# Patient Record
Sex: FEMALE | Race: WHITE | Employment: UNEMPLOYED | URBAN - METROPOLITAN AREA
[De-identification: names, ages, dates, MRNs, and addresses within clinical notes are randomized per-mention and may not be internally consistent; named-entity substitution may affect disease eponyms.]

---

## 2017-09-25 ENCOUNTER — HOSPITAL ENCOUNTER (INPATIENT)
Age: 77
LOS: 7 days | Discharge: HOME HEALTH | DRG: 813 | End: 2017-10-02
Attending: EMERGENCY MEDICINE | Admitting: INTERNAL MEDICINE
Payer: MEDICARE

## 2017-09-25 DIAGNOSIS — R79.1 SUPRATHERAPEUTIC INR: ICD-10-CM

## 2017-09-25 DIAGNOSIS — R04.0 EPISTAXIS: Primary | ICD-10-CM

## 2017-09-25 PROBLEM — D62 ACUTE BLOOD LOSS ANEMIA: Status: ACTIVE | Noted: 2017-09-25

## 2017-09-25 LAB
ABSOLUTE EOS #: 0.3 K/UL (ref 0–0.4)
ABSOLUTE LYMPH #: 2.7 K/UL (ref 1–4.8)
ABSOLUTE MONO #: 0.8 K/UL (ref 0.2–0.8)
ANION GAP: 18 MMOL/L (ref 8–16)
BASOPHILS # BLD: 0 %
BASOPHILS ABSOLUTE: 0 K/UL (ref 0–0.2)
DIFFERENTIAL TYPE: ABNORMAL
EOSINOPHILS RELATIVE PERCENT: 3 %
GLUCOSE BLD-MCNC: 189 MG/DL (ref 65–105)
GLUCOSE BLD-MCNC: 192 MG/DL (ref 65–105)
GLUCOSE BLD-MCNC: 273 MG/DL (ref 65–105)
GLUCOSE BLD-MCNC: 281 MG/DL (ref 74–106)
HCT VFR BLD CALC: 21.8 % (ref 36–46)
HCT VFR BLD CALC: 24.7 % (ref 36–46)
HCT VFR BLD CALC: 25.6 % (ref 36–46)
HCT VFR BLD CALC: 27.2 % (ref 36–46)
HEMOGLOBIN: 6.8 G/DL (ref 12–16)
HEMOGLOBIN: 7.8 G/DL (ref 12–16)
HEMOGLOBIN: 7.9 G/DL (ref 12–16)
HEMOGLOBIN: 8.5 G/DL (ref 12–16)
INR BLD: 1.5
INR BLD: 3.8
LYMPHOCYTES # BLD: 28 %
MCH RBC QN AUTO: 24.6 PG (ref 26–34)
MCHC RBC AUTO-ENTMCNC: 31.3 G/DL (ref 31–37)
MCV RBC AUTO: 78.5 FL (ref 80–100)
MONOCYTES # BLD: 8 %
PDW BLD-RTO: 19.1 % (ref 11.5–14.5)
PLATELET # BLD: 328 K/UL (ref 130–400)
PLATELET ESTIMATE: ABNORMAL
PMV BLD AUTO: 6.7 FL (ref 6–12)
POC BUN: 54 MG/DL (ref 6–20)
POC CHLORIDE: 97 MMOL/L (ref 98–110)
POC CREATININE: 1.7 MG/DL (ref 0.6–1.4)
POC HEMATOCRIT: 30 % (ref 36–46)
POC HEMOGLOBIN: 10.2 G/DL (ref 12–16)
POC IONIZED CALCIUM: 1.05 MMOL/L (ref 1.13–1.33)
POC POTASSIUM: 4.2 MMOL/L (ref 3.5–5.1)
POC SODIUM: 137 MMOL/L (ref 136–145)
POC TCO2: 27 MMOL/L (ref 20–31)
PROTHROMBIN TIME: 15.8 SEC (ref 9.7–11.6)
PROTHROMBIN TIME: 41.5 SEC (ref 9.7–11.6)
RBC # BLD: 3.46 M/UL (ref 4–5.2)
RBC # BLD: ABNORMAL 10*6/UL
SEG NEUTROPHILS: 61 %
SEGMENTED NEUTROPHILS ABSOLUTE COUNT: 6 K/UL (ref 1.8–7.7)
WBC # BLD: 9.8 K/UL (ref 3.5–11)
WBC # BLD: ABNORMAL 10*3/UL

## 2017-09-25 PROCEDURE — 85025 COMPLETE CBC W/AUTO DIFF WBC: CPT

## 2017-09-25 PROCEDURE — 99284 EMERGENCY DEPT VISIT MOD MDM: CPT

## 2017-09-25 PROCEDURE — 85014 HEMATOCRIT: CPT

## 2017-09-25 PROCEDURE — 6360000002 HC RX W HCPCS: Performed by: EMERGENCY MEDICINE

## 2017-09-25 PROCEDURE — 2500000003 HC RX 250 WO HCPCS: Performed by: INTERNAL MEDICINE

## 2017-09-25 PROCEDURE — 86901 BLOOD TYPING SEROLOGIC RH(D): CPT

## 2017-09-25 PROCEDURE — 85018 HEMOGLOBIN: CPT

## 2017-09-25 PROCEDURE — 80047 BASIC METABLC PNL IONIZED CA: CPT

## 2017-09-25 PROCEDURE — 6360000002 HC RX W HCPCS

## 2017-09-25 PROCEDURE — 36430 TRANSFUSION BLD/BLD COMPNT: CPT

## 2017-09-25 PROCEDURE — 2580000003 HC RX 258: Performed by: INTERNAL MEDICINE

## 2017-09-25 PROCEDURE — 86927 PLASMA FRESH FROZEN: CPT

## 2017-09-25 PROCEDURE — 6360000002 HC RX W HCPCS: Performed by: INTERNAL MEDICINE

## 2017-09-25 PROCEDURE — 6370000000 HC RX 637 (ALT 250 FOR IP): Performed by: INTERNAL MEDICINE

## 2017-09-25 PROCEDURE — P9016 RBC LEUKOCYTES REDUCED: HCPCS

## 2017-09-25 PROCEDURE — 85610 PROTHROMBIN TIME: CPT

## 2017-09-25 PROCEDURE — P9017 PLASMA 1 DONOR FRZ W/IN 8 HR: HCPCS

## 2017-09-25 PROCEDURE — 30905 CONTROL OF NOSEBLEED: CPT

## 2017-09-25 PROCEDURE — 86850 RBC ANTIBODY SCREEN: CPT

## 2017-09-25 PROCEDURE — 36415 COLL VENOUS BLD VENIPUNCTURE: CPT

## 2017-09-25 PROCEDURE — 6370000000 HC RX 637 (ALT 250 FOR IP): Performed by: EMERGENCY MEDICINE

## 2017-09-25 PROCEDURE — 82947 ASSAY GLUCOSE BLOOD QUANT: CPT

## 2017-09-25 PROCEDURE — 83036 HEMOGLOBIN GLYCOSYLATED A1C: CPT

## 2017-09-25 PROCEDURE — 86900 BLOOD TYPING SEROLOGIC ABO: CPT

## 2017-09-25 PROCEDURE — 96374 THER/PROPH/DIAG INJ IV PUSH: CPT

## 2017-09-25 PROCEDURE — 2580000003 HC RX 258: Performed by: EMERGENCY MEDICINE

## 2017-09-25 PROCEDURE — 86920 COMPATIBILITY TEST SPIN: CPT

## 2017-09-25 PROCEDURE — 2000000000 HC ICU R&B

## 2017-09-25 RX ORDER — ALPRAZOLAM 0.25 MG/1
0.25 TABLET ORAL 2 TIMES DAILY PRN
Status: DISCONTINUED | OUTPATIENT
Start: 2017-09-25 | End: 2017-09-29

## 2017-09-25 RX ORDER — SODIUM CHLORIDE 0.9 % (FLUSH) 0.9 %
10 SYRINGE (ML) INJECTION EVERY 12 HOURS SCHEDULED
Status: DISCONTINUED | OUTPATIENT
Start: 2017-09-25 | End: 2017-10-02 | Stop reason: HOSPADM

## 2017-09-25 RX ORDER — ACETAMINOPHEN 325 MG/1
650 TABLET ORAL EVERY 4 HOURS PRN
Status: DISCONTINUED | OUTPATIENT
Start: 2017-09-25 | End: 2017-10-02 | Stop reason: HOSPADM

## 2017-09-25 RX ORDER — DEXTROSE MONOHYDRATE 25 G/50ML
12.5 INJECTION, SOLUTION INTRAVENOUS PRN
Status: DISCONTINUED | OUTPATIENT
Start: 2017-09-25 | End: 2017-10-02 | Stop reason: HOSPADM

## 2017-09-25 RX ORDER — ATORVASTATIN CALCIUM 40 MG/1
40 TABLET, FILM COATED ORAL DAILY
Status: DISCONTINUED | OUTPATIENT
Start: 2017-09-26 | End: 2017-10-02 | Stop reason: HOSPADM

## 2017-09-25 RX ORDER — 0.9 % SODIUM CHLORIDE 0.9 %
250 INTRAVENOUS SOLUTION INTRAVENOUS ONCE
Status: DISCONTINUED | OUTPATIENT
Start: 2017-09-25 | End: 2017-10-02 | Stop reason: HOSPADM

## 2017-09-25 RX ORDER — METOPROLOL SUCCINATE 50 MG/1
50 TABLET, EXTENDED RELEASE ORAL DAILY
COMMUNITY

## 2017-09-25 RX ORDER — SODIUM CHLORIDE 0.9 % (FLUSH) 0.9 %
10 SYRINGE (ML) INJECTION PRN
Status: DISCONTINUED | OUTPATIENT
Start: 2017-09-25 | End: 2017-10-02 | Stop reason: HOSPADM

## 2017-09-25 RX ORDER — VENLAFAXINE HYDROCHLORIDE 75 MG/1
225 CAPSULE, EXTENDED RELEASE ORAL DAILY
Status: DISCONTINUED | OUTPATIENT
Start: 2017-09-26 | End: 2017-10-02 | Stop reason: HOSPADM

## 2017-09-25 RX ORDER — SODIUM CHLORIDE 9 MG/ML
INJECTION, SOLUTION INTRAVENOUS CONTINUOUS
Status: DISCONTINUED | OUTPATIENT
Start: 2017-09-25 | End: 2017-09-29

## 2017-09-25 RX ORDER — SPIRONOLACTONE 25 MG/1
25 TABLET ORAL DAILY
COMMUNITY

## 2017-09-25 RX ORDER — METOPROLOL SUCCINATE 50 MG/1
50 TABLET, EXTENDED RELEASE ORAL DAILY
Status: DISCONTINUED | OUTPATIENT
Start: 2017-09-26 | End: 2017-10-02 | Stop reason: HOSPADM

## 2017-09-25 RX ORDER — TRANEXAMIC ACID 100 MG/ML
500 INJECTION, SOLUTION INTRAVENOUS ONCE
Status: DISCONTINUED | OUTPATIENT
Start: 2017-09-25 | End: 2017-10-02 | Stop reason: HOSPADM

## 2017-09-25 RX ORDER — LORAZEPAM 2 MG/ML
1 INJECTION INTRAMUSCULAR ONCE
Status: COMPLETED | OUTPATIENT
Start: 2017-09-25 | End: 2017-09-25

## 2017-09-25 RX ORDER — BUMETANIDE 2 MG/1
2 TABLET ORAL SEE ADMIN INSTRUCTIONS
Status: ON HOLD | COMMUNITY
End: 2017-10-02

## 2017-09-25 RX ORDER — MORPHINE SULFATE 2 MG/ML
1 INJECTION, SOLUTION INTRAMUSCULAR; INTRAVENOUS EVERY 4 HOURS PRN
Status: DISCONTINUED | OUTPATIENT
Start: 2017-09-25 | End: 2017-09-25

## 2017-09-25 RX ORDER — WARFARIN SODIUM 1 MG/1
0.5 TABLET ORAL DAILY
Status: ON HOLD | COMMUNITY
End: 2017-10-02 | Stop reason: HOSPADM

## 2017-09-25 RX ORDER — ONDANSETRON 2 MG/ML
4 INJECTION INTRAMUSCULAR; INTRAVENOUS EVERY 6 HOURS PRN
Status: DISCONTINUED | OUTPATIENT
Start: 2017-09-25 | End: 2017-10-02 | Stop reason: HOSPADM

## 2017-09-25 RX ORDER — DILTIAZEM HYDROCHLORIDE 5 MG/ML
10 INJECTION INTRAVENOUS ONCE
Status: COMPLETED | OUTPATIENT
Start: 2017-09-25 | End: 2017-09-25

## 2017-09-25 RX ORDER — WARFARIN SODIUM 7.5 MG/1
7.5 TABLET ORAL DAILY
Status: ON HOLD | COMMUNITY
End: 2017-10-02 | Stop reason: HOSPADM

## 2017-09-25 RX ORDER — 0.9 % SODIUM CHLORIDE 0.9 %
1000 INTRAVENOUS SOLUTION INTRAVENOUS ONCE
Status: COMPLETED | OUTPATIENT
Start: 2017-09-25 | End: 2017-09-25

## 2017-09-25 RX ORDER — NITROGLYCERIN 0.4 MG/1
0.4 TABLET SUBLINGUAL EVERY 5 MIN PRN
Status: DISCONTINUED | OUTPATIENT
Start: 2017-09-25 | End: 2017-10-02 | Stop reason: HOSPADM

## 2017-09-25 RX ORDER — 0.9 % SODIUM CHLORIDE 0.9 %
250 INTRAVENOUS SOLUTION INTRAVENOUS ONCE
Status: COMPLETED | OUTPATIENT
Start: 2017-09-25 | End: 2017-09-25

## 2017-09-25 RX ORDER — NICOTINE POLACRILEX 4 MG
15 LOZENGE BUCCAL PRN
Status: DISCONTINUED | OUTPATIENT
Start: 2017-09-25 | End: 2017-10-02 | Stop reason: HOSPADM

## 2017-09-25 RX ORDER — FENTANYL CITRATE 50 UG/ML
25 INJECTION, SOLUTION INTRAMUSCULAR; INTRAVENOUS ONCE
Status: COMPLETED | OUTPATIENT
Start: 2017-09-25 | End: 2017-09-25

## 2017-09-25 RX ORDER — DEXTROSE MONOHYDRATE 50 MG/ML
100 INJECTION, SOLUTION INTRAVENOUS PRN
Status: DISCONTINUED | OUTPATIENT
Start: 2017-09-25 | End: 2017-10-02 | Stop reason: HOSPADM

## 2017-09-25 RX ORDER — 0.9 % SODIUM CHLORIDE 0.9 %
50 INTRAVENOUS SOLUTION INTRAVENOUS ONCE
Status: DISCONTINUED | OUTPATIENT
Start: 2017-09-25 | End: 2017-09-25 | Stop reason: ALTCHOICE

## 2017-09-25 RX ORDER — OXYMETAZOLINE HYDROCHLORIDE 0.05 G/100ML
2 SPRAY NASAL ONCE
Status: COMPLETED | OUTPATIENT
Start: 2017-09-25 | End: 2017-09-25

## 2017-09-25 RX ORDER — ONDANSETRON 2 MG/ML
INJECTION INTRAMUSCULAR; INTRAVENOUS
Status: COMPLETED
Start: 2017-09-25 | End: 2017-09-25

## 2017-09-25 RX ORDER — DILTIAZEM HYDROCHLORIDE 120 MG/1
120 CAPSULE, COATED, EXTENDED RELEASE ORAL DAILY
Status: DISCONTINUED | OUTPATIENT
Start: 2017-09-26 | End: 2017-10-02 | Stop reason: HOSPADM

## 2017-09-25 RX ORDER — DILTIAZEM HYDROCHLORIDE 120 MG/1
120 CAPSULE, COATED, EXTENDED RELEASE ORAL DAILY
COMMUNITY

## 2017-09-25 RX ORDER — LEVALBUTEROL 1.25 MG/.5ML
1.25 SOLUTION, CONCENTRATE RESPIRATORY (INHALATION) EVERY 4 HOURS PRN
Status: DISCONTINUED | OUTPATIENT
Start: 2017-09-25 | End: 2017-10-02 | Stop reason: HOSPADM

## 2017-09-25 RX ORDER — ALLOPURINOL 100 MG/1
100 TABLET ORAL DAILY
Status: DISCONTINUED | OUTPATIENT
Start: 2017-09-26 | End: 2017-10-02 | Stop reason: HOSPADM

## 2017-09-25 RX ADMIN — SODIUM CHLORIDE: 9 INJECTION, SOLUTION INTRAVENOUS at 19:29

## 2017-09-25 RX ADMIN — ALPRAZOLAM 0.25 MG: 0.25 TABLET ORAL at 21:52

## 2017-09-25 RX ADMIN — ACETAMINOPHEN 650 MG: 325 TABLET ORAL at 19:36

## 2017-09-25 RX ADMIN — OXYMETAZOLINE HYDROCHLORIDE 2 SPRAY: 0.05 SPRAY NASAL at 05:08

## 2017-09-25 RX ADMIN — DILTIAZEM HYDROCHLORIDE 10 MG: 5 INJECTION INTRAVENOUS at 10:04

## 2017-09-25 RX ADMIN — Medication 0.5 MG: at 19:25

## 2017-09-25 RX ADMIN — MORPHINE SULFATE 1 MG: 2 INJECTION, SOLUTION INTRAMUSCULAR; INTRAVENOUS at 08:34

## 2017-09-25 RX ADMIN — CEFTRIAXONE SODIUM 1 G: 1 INJECTION, POWDER, FOR SOLUTION INTRAMUSCULAR; INTRAVENOUS at 10:12

## 2017-09-25 RX ADMIN — ONDANSETRON 4 MG: 2 INJECTION INTRAMUSCULAR; INTRAVENOUS at 08:40

## 2017-09-25 RX ADMIN — SODIUM CHLORIDE: 9 INJECTION, SOLUTION INTRAVENOUS at 08:36

## 2017-09-25 RX ADMIN — LORAZEPAM 1 MG: 2 INJECTION INTRAMUSCULAR; INTRAVENOUS at 04:38

## 2017-09-25 RX ADMIN — Medication 0.5 MG: at 11:18

## 2017-09-25 RX ADMIN — SODIUM CHLORIDE 250 ML: 0.9 INJECTION, SOLUTION INTRAVENOUS at 14:15

## 2017-09-25 RX ADMIN — FENTANYL CITRATE 25 MCG: 50 INJECTION INTRAMUSCULAR; INTRAVENOUS at 05:58

## 2017-09-25 RX ADMIN — INSULIN LISPRO 1 UNITS: 100 INJECTION, SOLUTION INTRAVENOUS; SUBCUTANEOUS at 21:50

## 2017-09-25 RX ADMIN — PHYTONADIONE 10 MG: 10 INJECTION, EMULSION INTRAMUSCULAR; INTRAVENOUS; SUBCUTANEOUS at 05:07

## 2017-09-25 RX ADMIN — SODIUM CHLORIDE 1000 ML: 9 INJECTION, SOLUTION INTRAVENOUS at 04:48

## 2017-09-25 ASSESSMENT — PAIN SCALES - GENERAL
PAINLEVEL_OUTOF10: 9
PAINLEVEL_OUTOF10: 10
PAINLEVEL_OUTOF10: 0
PAINLEVEL_OUTOF10: 9
PAINLEVEL_OUTOF10: 10
PAINLEVEL_OUTOF10: 0
PAINLEVEL_OUTOF10: 9

## 2017-09-25 ASSESSMENT — ENCOUNTER SYMPTOMS
ALLERGIC/IMMUNOLOGIC NEGATIVE: 1
DIARRHEA: 0
RESPIRATORY NEGATIVE: 1
VOMITING: 1
SHORTNESS OF BREATH: 0
CHEST TIGHTNESS: 0
WHEEZING: 0
CONSTIPATION: 0
BACK PAIN: 0
COUGH: 0
SORE THROAT: 0
NAUSEA: 1
TROUBLE SWALLOWING: 0
ABDOMINAL PAIN: 0
EYES NEGATIVE: 1

## 2017-09-25 ASSESSMENT — PAIN DESCRIPTION - PROGRESSION

## 2017-09-25 ASSESSMENT — PAIN DESCRIPTION - LOCATION
LOCATION: FACE;NOSE
LOCATION: HEAD;NOSE

## 2017-09-25 ASSESSMENT — PAIN DESCRIPTION - FREQUENCY: FREQUENCY: CONTINUOUS

## 2017-09-25 ASSESSMENT — PAIN DESCRIPTION - ORIENTATION: ORIENTATION: LEFT;ANTERIOR

## 2017-09-25 ASSESSMENT — PAIN DESCRIPTION - ONSET: ONSET: ON-GOING

## 2017-09-25 ASSESSMENT — PAIN DESCRIPTION - PAIN TYPE
TYPE: ACUTE PAIN
TYPE: ACUTE PAIN

## 2017-09-25 ASSESSMENT — PAIN DESCRIPTION - DESCRIPTORS: DESCRIPTORS: CONSTANT;PRESSURE;BURNING

## 2017-09-25 NOTE — IP AVS SNAPSHOT
spironolactone 25 MG tablet   Commonly known as:  ALDACTONE       venlafaxine 75 MG extended release capsule   Commonly known as:  EFFEXOR XR       warfarin 5 MG tablet   Commonly known as:  COUMADIN   Take 1 tablet by mouth daily       * Notice: This list has 2 medication(s) that are the same as other medications prescribed for you. Read the directions carefully, and ask your doctor or other care provider to review them with you.       ASK your doctor about these medications     apixaban 5 MG Tabs tablet   Commonly known as:  ELIQUIS   Take 1 tablet by mouth 2 times daily

## 2017-09-25 NOTE — ED NOTES
Patient presents to the ED per EMS with epistaxis. Patient states that her nose started to bleed around 1400 yesterday and it was intermittently stop and start until she decided to call EMS around 0300. Upon arrival to the ED patient has large amount of blood coming from her left nare and she is also spitting up blood that has drained down the back of her throat. Patient is currently in AFib and takes coumadin for the AFib and just had a PT INR checked on Wednesday. Patient is alert and talking but slightly confused, she states that she is dizzy. Respirations are even and unlabored with clear lung sounds. Skin is pale and cool with multiple areas that look like bug bites.      Mera Cuenca RN  09/25/17 6857

## 2017-09-25 NOTE — IP AVS SNAPSHOT
Patient Information     Patient Name MARTY Newby 1940      Important Information for Heart Failure       If your condition worsens or if you have any concerns, call your doctor or seek emergency medical services (dial ) as needed. If you have any of the following symptoms/conditions, call your doctor. Call your primary care physician to obtain results of outstanding lab tests, cultures, x-rays, or other tests. If you have a current diagnosis or history of any of the following, please review the information carefully. HEART FAILURE PATIENTS:   DISCHARGE WEIGHT: Weight: 250 lb (113.4 kg)  Record daily weights. Notify your doctor if you have a weight gain 2 pounds in a day, or 3-5 pounds in 1 week. Notify your doctor for increased shortness of breath, or swelling in legs or feet. Follow a low sodium diet. Resume normal activity unless otherwise instructed by your doctor.

## 2017-09-25 NOTE — PROGRESS NOTES
Pharmacy Accuracy Service Medication History Note    The patient's list of current home medications has been reviewed. The patient's allergy list has been reviewed and updated. Source(s) of information: Patient daughter/ medication bottles/[facility] ([phone])    Based on information provided by the above source(s), I have updated the patient's home med list as described below. Please review the ACTION REQUESTED BY PHYSICIAN section of this note below for any discrepancies on current hospital orders. I changed or updated the following medications on the patient's home medication list:  Discontinued · Dulera 200/5 - pt doesn't use regularly, no fills at [facility]     Added · none     Adjusted   · Lopressor 50mg BID to Toprol XL 50mg daily  · Humulin N 35 units bid to Novolin N 60 units qam   Other Notes · Currently on 8mg daily of warfarin. 7.5mg x 1 tab and 1/2 of 1mg tablet daily. PHYSICIAN ACTION REQUESTED  Discrepancies on current hospital orders that need to be addressed by a physician:    Medication Action Requested        Home meds ready for reconciliation         Please feel free to call me with any questions about this encounter. Thank you.     Yannick Hill PharmD  Pharmacy Medication Accuracy Review Service  Phone:  [phone]  Fax: [phone]      Electronically signed by Yannick Hill, [address] [place] [address] on 9/25/2017 at 10:14 AM

## 2017-09-25 NOTE — CONSULTS
Department of Otolaryngology    Assesment/Plan:   Left sided epistaxis- packing in place, bleeding stopped, start saline nasal sprays to each nostril several times a day. Observe in ICU due to altered mental status. CHIEF COMPLAINT:  Nose bleed    HISTORY OF PRESENT ILLNESS:              Renny Estrella  is a 67 y.o. female with acute onset of nose bleed this am due to elevated INR. Packing placed by ED. Admitted to ICU. Having anticoagulation reversed. No previous issues with nose bleeds. Past Medical History:        Diagnosis Date    Anxiety     Cancer ([facility]     cervical    CHF (congestive heart failure) (HCC)     COPD (chronic obstructive pulmonary disease) (HCC)     Depression (emotion)     [facility]     Hyperlipidemia     Hypertension     Tachycardia     Vertigo      Past Surgical History:        Procedure Laterality Date    HYSTERECTOMY       Current Medications:   Current Facility-Administered Medications: 0.9 % sodium chloride bolus, 250 mL, Intravenous, Once  tranexamic acid (CYKLOKAPRON) injection 500 mg, 500 mg, Topical, Once  sterile water injection 5 mL, 5 mL, Injection, Once  sodium chloride flush 0.9 % injection 10 mL, 10 mL, Intravenous, 2 times per day  sodium chloride flush 0.9 % injection 10 mL, 10 mL, Intravenous, PRN  acetaminophen (TYLENOL) tablet 650 mg, 650 mg, Oral, Q4H PRN  0.9 % sodium chloride infusion, , Intravenous, Continuous  cefTRIAXone (ROCEPHIN) 1 g IVPB in 50 mL D5W minibag, 1 g, Intravenous, Q24H  ondansetron (ZOFRAN) injection 4 mg, 4 mg, Intravenous, Q6H PRN  HYDROmorphone (DILAUDID) injection 0.5 mg, 0.5 mg, Intravenous, Q4H PRN  Allergies:  Lisinopril    Social History:    Social History     Social History    Marital status:       Spouse name: N/A    Number of children: N/A    Years of education: N/A     Social History Main Topics    Smoking status: Former Smoker     Types: Cigarettes     Quit date: 1970    Smokeless tobacco: Not on file    Alcohol use No    Drug use: No    Sexual activity: Not Currently     Other Topics Concern    Not on file     Social History Narrative       Family History:        Problem Relation Age of Onset    Other Mother     Heart Disease Father        REVIEW OF SYSTEMS:  As above and:  CONSTITUTIONAL:  negative  EYES:  negative   HEENT:  negative   RESPIRATORY:  negative   CARDIOVASCULAR:  negative    GASTROINTESTINAL:  negative   GENITOURINARY:  negative   INTEGUMENT/BREAST:  negative   HEMATOLOGIC/LYMPHATIC:  negative   ALLERGIC/IMMUNOLOGIC:  negative   ENDOCRINE:  negative   MUSCULOSKELETAL:  negative   NEUROLOGICAL:  negative   BEHAVIOR/PSYCH:  negative     PHYSICAL EXAM:    VITALS:  /76  Pulse 96  Temp 98.8 °F (37.1 °C)  Resp 18  Ht 5' 4\" (1.626 m)  Wt 250 lb (113.4 kg)  LMP  (LMP Unknown)  SpO2 93%  BMI 42.91 kg/m2      CONSTITUTIONAL:  awake, alert, cooperative, no apparent distress, and appears stated age  EYES:  Lids and lashes normal, pupils equal, round and reactive to light, extra ocular muscles intact, sclera clear, conjunctiva normal  ENT:  Normocephalic, without obvious abnormality, atraumatic, sinuses nontender on palpation, left nasal passage with packing in place, external ears without lesions, oral pharynx with dry mucus membranes, tonsils without erythema or exudates, gums normal.  NECK:  Supple, symmetrical, trachea midline, no adenopathy, thyroid symmetric, not enlarged and no tenderness, skin normal  MUSCULOSKELETAL:  There is no redness, warmth, or swelling of the joints. Full range of motion noted. Motor strength is 5 out of 5 all extremities bilaterally. Tone is normal.  NEUROLOGIC:  Awake, alert, oriented to name, place and time. Cranial nerves II-XII are grossly intact. Motor is 5 out of 5 bilaterally. Sensory is intact.      DATA:    Labs and Radiology reports/films reviewed

## 2017-09-25 NOTE — FLOWSHEET NOTE
Patient sleeping; family x1 at bedside. Family member states her  is the wise of their [employer]ism; expresses no spiritual/emotional needs at this time; thanks  for visiting. Spiritual Care will follow as needed.      09/25/17 1106   Encounter Summary   Services provided to: Family   Referral/Consult From: [facility] Family members   Contact Confucianist Completed   Continue Visiting (9/25/17)   Complexity of Encounter Low   Length of Encounter 15 minutes   Spiritual Assessment Completed Yes   Routine   Type Initial   Assessment Approachable   Intervention Active listening   Outcome Expressed gratitude

## 2017-09-25 NOTE — ED PROVIDER NOTES
[facility] ED      eMERGENCY dEPARTMENT eNCOUnter                       Pt Name: Grace Miranda  MRN: [id number]  [place] 1940  Date of evaluation: 9/25/2017  PCP:  Prince Cortes MD    CHIEF COMPLAINT       Chief Complaint   Patient presents with    Epistaxis         HISTORY OF PRESENT ILLNESS    Grace Miranda is a 67 y.o. female who presents With epistaxis. Patient had epistaxis since 2:00 yesterday which is intermittent in nature with profuse epistaxis that started this evening. According to [facility] patient had lost approximately 500 mL to 1000 mL of blood. Patient does have a known history of atrial fibrillation and is currently on Coumadin 8 mg daily. She does last INR was between 2.6 and 2.8 checked at [facility].  Patient is complaining of coughing up blood that she was swelling from the nosebleed. Bleeding is some mild dyspnea associated with it. Denies any abdominal pain diarrhea constipation or blood in the stools or blood in the urine. REVIEW OF SYSTEMS       Review of Systems   Constitutional: Negative. HENT: Positive for nosebleeds and postnasal drip. Negative for sore throat and trouble swallowing. Eyes: Negative. Respiratory: Negative. Negative for cough, chest tightness, shortness of breath and wheezing. Cardiovascular: Negative. Gastrointestinal: Positive for nausea and vomiting. Negative for abdominal pain, constipation and diarrhea. Endocrine: Negative. Genitourinary: Negative. Negative for dysuria and flank pain. Musculoskeletal: Negative. Negative for back pain. Skin: Negative. Allergic/Immunologic: Negative. Neurological: Negative. Hematological: Negative. Psychiatric/Behavioral: Negative. PAST MEDICAL HISTORY/SURGICAL HISTORY/FAMILY HISTORY      has a past medical history of Anxiety; Cancer [facility]); CHF (congestive heart failure) ([facility] COPD (chronic obstructive pulmonary disease) ([facility]  Depression (emotion); Diabetes mellitus ([facility] Hyperlipidemia; Hypertension; Tachycardia; and Vertigo. Past medical hx was reviewed and confirmed by myself and the patient. has a past surgical history that includes Hysterectomy. Past surgical hx was reviewed and confirmed by myself and the patient. indicated that the status of her mother is unknown. She indicated that the status of her father is unknown.    family history includes Heart Disease in her father; Other in her mother. Past family hx was reviewed with patient and confirmed by myself and the patient    CURRENT MEDICATIONS       Previous Medications    ALLOPURINOL (ZYLOPRIM) 100 MG TABLET    Take 1 tablet by mouth daily    ASPIRIN 325 MG TABLET    Take 1 tablet by mouth daily Not on list from Office    ATORVASTATIN (LIPITOR) 40 MG TABLET    Take 40 mg by mouth daily    BUMETANIDE (BUMEX) 2 MG TABLET    Take 1 tablet by mouth daily    DILTIAZEM (CARDIZEM CD) 120 MG ER CAPSULE    Take 1 capsule by mouth daily    INSULIN LISPRO (HUMALOG) 100 UNIT/ML INJECTION VIAL    Inject 0-12 Units into the skin 3 times daily (with meals)    INSULIN LISPRO (HUMALOG) 100 UNIT/ML INJECTION VIAL    Inject 0-6 Units into the skin nightly    INSULIN NPH (HUMULIN N) 100 UNIT/ML INJECTION VIAL    Inject 35 Units into the skin 2 times daily (before meals)    LEVALBUTEROL (XOPENEX) 1.25 MG/0.5ML NEBULIZER SOLUTION    Take 0.5 mLs by nebulization every 4 hours as needed for Wheezing    MAGNESIUM (MAGNESIUM-OXIDE) 250 MG TABS TABLET    Take 500 mg by mouth 2 times daily Indications: 2 tab BID     METOPROLOL TARTRATE (LOPRESSOR) 50 MG TABLET    Take 1 tablet by mouth 2 times daily    MOMETASONE-FORMOTEROL (DULERA) 200-5 MCG/ACT INHALER    Inhale 2 puffs into the lungs 2 times daily    NITROGLYCERIN (NITROSTAT) 0.4 MG SL TABLET    Place 0.4 mg under the tongue every 5 minutes as needed for Chest pain    OMEPRAZOLE (PRILOSEC) 20 MG CAPSULE    Take 20 mg by mouth daily.     SPIRONOLACTONE (ALDACTONE) 25

## 2017-09-25 NOTE — IP AVS SNAPSHOT
Take 500 mg by mouth 2 times daily Indications: 2 tab BID                                         metoprolol succinate 50 MG extended release tablet   Commonly known as:  TOPROL XL   Take 50 mg by mouth daily                50 mg on 10/2/2017  8:57 AM         10/3/2017                   nitroGLYCERIN 0.4 MG SL tablet   Commonly known as:  NITROSTAT   Place 0.4 mg under the tongue every 5 minutes as needed for Chest pain                                         omeprazole 20 MG delayed release capsule   Commonly known as:  PRILOSEC   Take 20 mg by mouth daily.                   Due 10-3-17 AM                       spironolactone 25 MG tablet   Commonly known as:  ALDACTONE   Take 25 mg by mouth daily                  Due 10-3-17 AM                       venlafaxine 75 MG extended release capsule   Commonly known as:  EFFEXOR XR   Take 225 mg by mouth daily                225 mg on 10/2/2017  8:57 AM     Due 10-13-17 AM    10/2/2017                     ASK your doctor about these medications if you have questions        Last Dose    Next Dose Due AM NOON PM NIGHT    apixaban 5 MG Tabs tablet   Commonly known as:  ELIQUIS   Take 1 tablet by mouth 2 times daily                                           These are the medications you have told us you were taking at home, STOP taking them after you leave the hospital     aspirin 325 MG tablet       NOVOLIN N SC            Where to Get Your Medications      These medications were sent to [address] [address], [facility] [phone] - F [phone]  [address] [place] [id number]     Phone:  [phone]     bumetanide 2 MG tablet         You can get these medications from any pharmacy     Bring a paper prescription for each of these medications     cephALEXin 500 MG capsule    clotrimazole 1 % cream    docusate 100 MG Caps    insulin glargine 100 UNIT/ML injection vial    senna 8.6 MG tablet    sodium chloride 0.65 % nasal spray Contact information:    [address]  [address] [facility] [address]  [phone]        Preventive Care        Date Due    Tetanus Combination Vaccine (1 - Tdap) 1958    Zoster Vaccine 1999    Osteoporosis screening or a bone density scan (Dexa) is recommended once at age 72. Based upon the results and risk factors for bone loss, your provider will recommend whether this needs to be repeated. 2004    Pneumococcal Vaccines (two) for all adults aged 72 and over (1 of 2 - PCV13) 2004    Yearly Flu Vaccine (1) 2017    Cholesterol Screening 8/3/2021                 Care Plan Once You Return Home    This section includes instructions you will need to follow once you leave the hospital.  Your care team will discuss these with you, so you and those caring for you know how to best care for your health needs at home. This section may also include educational information about certain health topics that may be of help to you.           Discharge [address] [place] [address] Form    Patient Name: Gala Dominguez   :  1940  MRN:  [id number]    Admit date:  2017  Discharge date:  10/2/17    Code Status Order: Full Code   Advance Directives: No    Admitting Physician:  Goldy Rosales MD  PCP: Rizwana Monteiro MD    Discharging Nurse: [facility] Unit/Room#: 1102/1102-01  Discharging Unit Phone Number: [phone]    Emergency Contact:        Past Surgical History:  Past Surgical History:   Procedure Laterality Date    HYSTERECTOMY         Immunization History:   Immunization History   Administered Date(s) Administered    Influenza Virus Vaccine 2015       Active Problems:  Patient Active Problem List   Diagnosis    COPD exacerb[facility]    Diabetes mellitus with hyperglycemia ([facility]    Hypertension    Hyperlipidemia    Depression    [facility]    Arthritis of right knee    Gout attack  Arthritis of right shoulder region    Acute kidney failure, unspecified ([facility]    UTI (urinary tract infection)    Asthmatic bronchitis with exacerbation    Hypomagnesemia    Type 2 diabetes mellitus without complication, with long-term current use of insulin (HCC)    Paroxysmal SVT (supraventricular tachycardia) (HCC)    [facility]est[facility]) ([facility])    Epistaxis    Acute blood loss anemia       Isolation/Infection:   Isolation     No Isolation            Nurse Assessment:  Last Vital Signs: BP (!) 126/46  Pulse 83  Temp 98.2 °F (36.8 °C) (Oral)   Resp 19  Ht 5' 4\" (1.626 m)  Wt 250 lb (113.4 kg)  LMP  (LMP Unknown)  SpO2 100%  BMI 42.91 kg/m2    Last documented pain score (0-10 scale): Pain Level: 0  Last Weight:   Wt Readings from Last 1 Encounters:   09/25/17 250 lb (113.4 kg)     Mental Status:  oriented     IV Access:  - None    Nursing Mobility/ADLs:  Walking   Assisted  Transfer  Independent  Bathing  Independent  Dressing  Independent  Toileting  Independent  Feeding  Independent  Med Admin  Assisted  Med Delivery   none    Wound Care Documentation and Therapy:        Elimination:  Continence:   · Bowel: Yes  · Bladder: Yes  Urinary Catheter: None   Colostomy/Ileostomy/Ileal Conduit: No    Date of Last BM: 10/2/17    Intake/Output Summary (Last 24 hours) at 09/27/17 1212  Last data filed at 09/27/17 0500   Gross per 24 hour   Intake             2196 ml   Output             1300 ml   Net              896 ml     I/O last 3 completed shifts: In: 2196 [P.O.:370;  I.V.:1826]  Out: 1300 [Urine:1300]    Safety Concerns:     History of Falls (last 30 days) and uses a walker to ambulate    Impairments/Disabilities:      None    Nutrition Therapy:  Current Nutrition Therapy:   - Oral Diet:  Carb Control 4 carbs/meal (1800kcals/day)    Routes of Feeding: Oral  Liquids: No Restrictions  Daily Fluid Restriction: no  Last Modified Barium Swallow with Video (Video Swallowing Test): not done Treatments at the Time of Hospital Discharge:   Respiratory Treatments: N/A  Oxygen Therapy:  is not on home oxygen therapy. Ventilator:    - No ventilator support    Rehab Therapies: Physical Therapy and Occupational Therapy  Weight Bearing Status/Restrictions: No weight bearing restirctions  Other Medical Equipment (for information only, NOT a DME order):  walker  Other Treatments: Skilled nursing assessment, medication teaching and compliance. Patient's personal belongings (please select all that are sent with patient):  Pants, shirt, undergarments, socks, shoes    RN SIGNATURE:  Electronically signed by Frankey Clos, RN on 10/2/17 at 7:49 PM    PHYSICIAN SECTION    Prognosis: Fair    Condition at Discharge: Stable    Rehab Potential (if transferring to Rehab): Fair    Recommended Labs or Other Treatments After Discharge: pt inr 10/4/10/6 bmp cbc 10 /6, then pt inr every Monday     Physician Certification: I certify the above information and transfer of Sophia Moe  is necessary for the continuing treatment of the diagnosis listed and that she requires [address] for less 30 days. Update Admission H&P: No change in H&P    PHYSICIAN SIGNATURE:  Electronically signed by Miranda Caldwell MD on 10/2/17 at 7:03 PM    CASE MANAGEMENT/SOCIAL WORK SECTION    Inpatient Status Date: 9/25/17    [facility] Readmission Risk Assessment Score:  Risk Score: 13.5   (Score > 14= high risk for readmission)    Discharging to Facility/ Agency   · Name:  [address] [place] [address]  · Address:  · Phone:   [phone]  · Fax:  [phone]    Dialysis Facility (if applicable)   · Name:  · Address:  · Dialysis Schedule:  · Phone:  · Fax:    / signature: Electronically signed by Sahra Ashley RN on 9/29/17 at 4:54 PM      Diet Instructions     ? Good nutrition is important when healing from an illness, injury, or surgery. Follow any nutrition recommendations given to you during your hospital stay. ? If you were given an oral nutrition supplement while in the hospital, continue to take this supplement at home. You can take it with meals, in-between meals, and/or before bedtime. These supplements can be purchased at most local grocery stores, pharmacies, and chain super-stores. ? If you have any questions about your diet or nutrition, call the hospital and ask for the dietitian. Diabetic diet           Activity Instructions     As tolerated           Important information for a smoker       SMOKING: QUIT SMOKING. THIS IS THE MOST IMPORTANT ACTION YOU CAN TAKE TO IMPROVE YOUR CURRENT AND FUTURE HEALTH. Call the [address] [place] [address] at [place] NOW ([phone])    Smoking harms nonsmokers. When nonsmokers are around people who smoke, they absorb nicotine, carbon monoxide, and other ingredients of tobacco smoke. DO NOT SMOKE AROUND CHILDREN     Children exposed to secondhand smoke are at an increased risk of:  Sudden Infant Death Syndrome (SIDS), acute respiratory infections, inflammation of the middle ear, and severe asthma. Over a longer time, it causes heart disease and lung cancer. There is no safe level of exposure to secondhand smoke. [employer] Signup     [employer] allows you to send messages to your doctor, view your test results, renew your prescriptions, schedule appointments, view visit notes, and more. How Do I Sign Up? 1. In your Internet browser, go to [email or web address]  2. Click on the Sign Up Now link in the Sign In box. You will see the New Member Sign Up page. 3. Enter your [employer] Access Code exactly as it appears below. You will not need to use this code after youve completed the sign-up process. If you do not sign up before the expiration date, you must request a new code. [employer] Access Code: [email or web address]  Expires: 12/1/2017  7:58 PM    4.  Enter your Social Security Number ([id number]) and Date of Birth

## 2017-09-25 NOTE — IP AVS SNAPSHOT
Patient Information     Patient Name MARTY Nunes 1940      WARFARIN INFORMATION       What is the most important information you should know about warfarin? Warfarin is a medicine that you take to prevent blood clots. It is often called a blood thinner. Doctors give warfarin (such as Coumadin) to reduce the risk of blood clots. Warfarin/Coumadin Instructions:  ? It is important to take your anticoagulant medication as instructed, and notify your physician if you are unable to for any reason. ? Complete any blood tests ordered for monitoring your medication; such as PT/INR, so your physician can adjust the dose if necessary. ? Eat a consistent amount of foods with Vitamin K, such as leafy greens. ? Avoid major changes in your dietary habits, or notify your health professional before changing habits. ? Follow up with your health care provider or clinic as directed by your physician for monitoring your anticoagulant medication. ? Diet and medication can affect your anticoagulant and PT/INR blood test.   ? Do not take or discontinue any medication or over-the-counter medication except on the advice of your physician or pharmacist.   ? Anticoagulants can increase the risk of bleeding.

## 2017-09-25 NOTE — CONSULTS
Anxiety; Cancer [facility]); CHF (congestive heart failure) ([facility] [address] COPD (chronic obstructive pulmonary disease) ([facility] [address] Depression (emotion); Diabetes mellitus ([facility] Hyperlipidemia; Hypertension; Tachycardia; and Vertigo. Surgical History:     Past Surgical History:   Procedure Laterality Date    HYSTERECTOMY       Medications:   Scheduled Meds:   sodium chloride  250 mL Intravenous Once    tranexamic acid  500 mg Topical Once    sterile water  5 mL Injection Once    sodium chloride flush  10 mL Intravenous 2 times per day    cefTRIAXone (ROCEPHIN) IV  1 g Intravenous Q24H     Continuous Infusions:   sodium chloride        Outpatient Prescriptions Marked as Taking for the 9/25/17 encounter [facility] Encounter)   Medication Sig Dispense Refill    spironolactone (ALDACTONE) 25 MG tablet Take 25 mg by mouth daily      bumetanide (BUMEX) 2 MG tablet Take 1 tablet by mouth daily (Patient taking differently: Take 2 mg by mouth daily Indications: 1 tab every other day 1.5 tabs every other day ) 30 tablet 3    warfarin (COUMADIN) 5 MG tablet As prescribed (Patient taking differently: 8 mg As prescribed) 30 tablet 3    venlafaxine (EFFEXOR-XR) 75 MG XR capsule Take 225 mg by mouth daily       atorvastatin (LIPITOR) 40 MG tablet Take 40 mg by mouth daily      diltiazem (CARDIZEM CD) 120 MG ER capsule Take 1 capsule by mouth daily 30 capsule 3    magnesium (MAGNESIUM-OXIDE) 250 MG TABS tablet Take 500 mg by mouth 2 times daily Indications: 2 tab BID       aspirin 325 MG tablet Take 1 tablet by mouth daily Not on list from Office 30 tablet 0    omeprazole (PRILOSEC) 20 MG capsule Take 20 mg by mouth daily. Allergies:   Lisinopril    Social History:    reports that she quit smoking about 47 years ago. Her smoking use included Cigarettes. She does not have any smokeless tobacco history on file. She reports that she does not drink alcohol or use illicit drugs.     Family History:    family history includes Heart Disease in her father; Other in her mother. Review of Systems:     Constitutional: No fever/chills. HENT: as per HPI Eyes: No blurred vision. Respiratory: As above. Cardiovascular: As above. Gastrointestinal: Negative. Genitourinary: Negative  Endocrine: + DM. Musculoskeletal: Negative. Skin: Negative. Allergic/Immunologic: Negative. Neurologic: Negative. Hematological: Negative. Psychiatric:+ Depression/Anxiety. All other systems are are noted to be otherwise negative. Physical Exam:   /77  Pulse 105  Temp 97.7 °F (36.5 °C) (Oral)   Resp 19  Ht 5' 4\" (1.626 m)  Wt 250 lb (113.4 kg)  LMP  (LMP Unknown)  SpO2 95%  BMI 42.91 kg/m2    Intake/Output Summary (Last 24 hours) at 09/25/17 0818  Last data filed at 09/25/17 0549   Gross per 24 hour   Intake              292 ml   Output                0 ml   Net              292 ml       GENERAL:  Alert, appropriate, oriented, appears uncomfortable. HEENT: + Pallor. + nasal rocket/gauze packing, dried blood to bilateral nares NECK: Supple without any thyromegaly. LUNGS: Generally decreased  CARDIAC: Irregular S1, S2.  ABD:  Obese, soft non-tender . EXT: + chronic LLE lymphedema. MS: No obvious deformities. SKIN: No obvious skin rashes. NEURO: No obvious focal neurologic deficits    Labs/ Ancillary data:     CBC:   Recent Labs      09/25/17 0428  09/25/17   0748   WBC  9.8   --    HGB  8.5*  7.8*   PLT  328   --      BMP:  Recent Labs      09/25/17 0425   CREATININE  1.7*     INR:   Recent Labs      09/25/17 0428   INR  3.8     Imaging:    Echo: 6/2016   Reported as:   Left ventricle is normal in size. Mild left ventricular hypertrophy. Global left ventricular systolic function is normal.  Estimated ejection fraction is 55 % . Grade I (mild) left ventricular diastolic dysfunction. Left atrium is mildly dilated. Aortic leaflet calcification with mild stenosis. Peak instantaneous gradient 22 mmHg and mean gradient 11 mmHg.   Trivial aortic insufficiency. Mitral valve sclerosis without stenosis. Trivial mitral regurgitation. Multiple MR jets noted. No significant pericardial effusion is seen. Impression :     Epistaxis    Blood loss anemia    Atrial Fibrillation on warfarin therapy    - INR supra-therapeutic     Chronic Diastolic Heart Failure     ADRIANE     - Cr 1.7 up from baseline    Hypertension    Hyperlipidemia    Diabetes Mellitus    Morbid Obesity with CHARITO    Plan :     INR has been reversed with vitamin K, FFP  Hold warfarin for now, history of intolerance to NOAC's per family  Appears fairly compensated from Diastolic HF standpoint, follow with IVF   Atrial Fibrillation with overall good rate control  Follow serial H/H, Follow up BMP    Patient follows with [facility], Dr. Maryjane Ying, and will follow up with this provider on discharge. Thank you very much for allowing us to participate in the care of this patient. Please call us with any questions. Will discuss patient findings and plan of care with Dr. Viviana Erickson. Electronically signed by Josie Fay CNP on 9/25/2017 at 8:18 AM    I have discussed the care of this patient including pertinent history and exam findings, reviewed the key elements of all parts of the encounter with Ana María Pearson CNP. I have seen and examined the patient independently. I agree with the assessment and plan as documented. Modifications were made as needed to the notes. Hx of chronic atrial fibrillation. Supratherapeutic on INR with epistaxis. Getting reversed with Vitamin K and FFP. Atrial fib heart rates increasing. Will give one dose of IV Diltiazem now. Resume po meds if ok with ENT. Hx of DHF - appears compensated. Will follow.     Electronically signed by Juli Rosales MD on 9/25/2017 at 8:58 AM

## 2017-09-26 LAB
ANION GAP SERPL CALCULATED.3IONS-SCNC: 12 MMOL/L (ref 9–17)
BLD PROD TYP BPU: NORMAL
BLD PROD TYP BPU: NORMAL
BUN BLDV-MCNC: 52 MG/DL (ref 8–23)
BUN/CREAT BLD: 37 (ref 9–20)
CALCIUM SERPL-MCNC: 8.7 MG/DL (ref 8.6–10.4)
CHLORIDE BLD-SCNC: 100 MMOL/L (ref 98–107)
CO2: 28 MMOL/L (ref 20–31)
CREAT SERPL-MCNC: 1.42 MG/DL (ref 0.5–0.9)
DISPENSE STATUS BLOOD BANK: NORMAL
DISPENSE STATUS BLOOD BANK: NORMAL
ESTIMATED AVERAGE GLUCOSE: 174 MG/DL
GFR AFRICAN AMERICAN: 43 ML/MIN
GFR NON-AFRICAN AMERICAN: 36 ML/MIN
GFR SERPL CREATININE-BSD FRML MDRD: ABNORMAL ML/MIN/{1.73_M2}
GFR SERPL CREATININE-BSD FRML MDRD: ABNORMAL ML/MIN/{1.73_M2}
GLUCOSE BLD-MCNC: 193 MG/DL (ref 70–99)
GLUCOSE BLD-MCNC: 196 MG/DL (ref 65–105)
GLUCOSE BLD-MCNC: 211 MG/DL (ref 65–105)
GLUCOSE BLD-MCNC: 282 MG/DL (ref 65–105)
HBA1C MFR BLD: 7.7 % (ref 4–6)
HCT VFR BLD CALC: 24.9 % (ref 36–46)
HCT VFR BLD CALC: 25.3 % (ref 36–46)
HCT VFR BLD CALC: 26.9 % (ref 36–46)
HEMOGLOBIN: 7.8 G/DL (ref 12–16)
HEMOGLOBIN: 8 G/DL (ref 12–16)
HEMOGLOBIN: 8.2 G/DL (ref 12–16)
INR BLD: 1.1
POTASSIUM SERPL-SCNC: 4.9 MMOL/L (ref 3.7–5.3)
PROTHROMBIN TIME: 11.7 SEC (ref 9.7–11.6)
SODIUM BLD-SCNC: 140 MMOL/L (ref 135–144)
TRANSFUSION STATUS: NORMAL
TRANSFUSION STATUS: NORMAL
UNIT DIVISION: 0
UNIT DIVISION: 0
UNIT NUMBER: NORMAL
UNIT NUMBER: NORMAL

## 2017-09-26 PROCEDURE — 36415 COLL VENOUS BLD VENIPUNCTURE: CPT

## 2017-09-26 PROCEDURE — 85018 HEMOGLOBIN: CPT

## 2017-09-26 PROCEDURE — 6370000000 HC RX 637 (ALT 250 FOR IP): Performed by: INTERNAL MEDICINE

## 2017-09-26 PROCEDURE — 85014 HEMATOCRIT: CPT

## 2017-09-26 PROCEDURE — 85610 PROTHROMBIN TIME: CPT

## 2017-09-26 PROCEDURE — 6360000002 HC RX W HCPCS: Performed by: INTERNAL MEDICINE

## 2017-09-26 PROCEDURE — 82947 ASSAY GLUCOSE BLOOD QUANT: CPT

## 2017-09-26 PROCEDURE — 2000000000 HC ICU R&B

## 2017-09-26 PROCEDURE — 2060000000 HC ICU INTERMEDIATE R&B

## 2017-09-26 PROCEDURE — 2580000003 HC RX 258: Performed by: INTERNAL MEDICINE

## 2017-09-26 PROCEDURE — 80048 BASIC METABOLIC PNL TOTAL CA: CPT

## 2017-09-26 RX ORDER — INSULIN GLARGINE 100 [IU]/ML
16 INJECTION, SOLUTION SUBCUTANEOUS NIGHTLY
Status: DISCONTINUED | OUTPATIENT
Start: 2017-09-26 | End: 2017-10-02 | Stop reason: HOSPADM

## 2017-09-26 RX ORDER — HYDROCODONE BITARTRATE AND ACETAMINOPHEN 5; 325 MG/1; MG/1
1 TABLET ORAL EVERY 4 HOURS PRN
Status: DISCONTINUED | OUTPATIENT
Start: 2017-09-26 | End: 2017-09-28

## 2017-09-26 RX ADMIN — ALPRAZOLAM 0.25 MG: 0.25 TABLET ORAL at 08:05

## 2017-09-26 RX ADMIN — SODIUM CHLORIDE: 9 INJECTION, SOLUTION INTRAVENOUS at 17:36

## 2017-09-26 RX ADMIN — INSULIN LISPRO 1 UNITS: 100 INJECTION, SOLUTION INTRAVENOUS; SUBCUTANEOUS at 22:10

## 2017-09-26 RX ADMIN — Medication 0.5 MG: at 12:06

## 2017-09-26 RX ADMIN — Medication 0.5 MG: at 01:53

## 2017-09-26 RX ADMIN — INSULIN LISPRO 3 UNITS: 100 INJECTION, SOLUTION INTRAVENOUS; SUBCUTANEOUS at 12:05

## 2017-09-26 RX ADMIN — Medication 0.5 MG: at 04:46

## 2017-09-26 RX ADMIN — ALPRAZOLAM 0.25 MG: 0.25 TABLET ORAL at 20:23

## 2017-09-26 RX ADMIN — INSULIN GLARGINE 16 UNITS: 100 INJECTION, SOLUTION SUBCUTANEOUS at 22:10

## 2017-09-26 RX ADMIN — Medication 0.5 MG: at 22:11

## 2017-09-26 RX ADMIN — VENLAFAXINE HYDROCHLORIDE 225 MG: 75 CAPSULE, EXTENDED RELEASE ORAL at 08:06

## 2017-09-26 RX ADMIN — CEFTRIAXONE SODIUM 1 G: 1 INJECTION, POWDER, FOR SOLUTION INTRAMUSCULAR; INTRAVENOUS at 08:30

## 2017-09-26 RX ADMIN — HYDROCODONE BITARTRATE AND ACETAMINOPHEN 1 TABLET: 5; 325 TABLET ORAL at 05:40

## 2017-09-26 RX ADMIN — Medication 0.5 MG: at 17:49

## 2017-09-26 RX ADMIN — Medication 0.5 MG: at 08:05

## 2017-09-26 RX ADMIN — METOPROLOL SUCCINATE 50 MG: 50 TABLET, FILM COATED, EXTENDED RELEASE ORAL at 08:06

## 2017-09-26 RX ADMIN — INSULIN LISPRO 2 UNITS: 100 INJECTION, SOLUTION INTRAVENOUS; SUBCUTANEOUS at 17:30

## 2017-09-26 RX ADMIN — ALLOPURINOL 100 MG: 100 TABLET ORAL at 08:06

## 2017-09-26 RX ADMIN — ATORVASTATIN CALCIUM 40 MG: 40 TABLET, FILM COATED ORAL at 08:06

## 2017-09-26 RX ADMIN — Medication 10 ML: at 08:05

## 2017-09-26 RX ADMIN — DILTIAZEM HYDROCHLORIDE 120 MG: 120 CAPSULE, COATED, EXTENDED RELEASE ORAL at 08:06

## 2017-09-26 ASSESSMENT — PAIN SCALES - GENERAL
PAINLEVEL_OUTOF10: 0
PAINLEVEL_OUTOF10: 7
PAINLEVEL_OUTOF10: 0
PAINLEVEL_OUTOF10: 7
PAINLEVEL_OUTOF10: 0
PAINLEVEL_OUTOF10: 0
PAINLEVEL_OUTOF10: 8
PAINLEVEL_OUTOF10: 0
PAINLEVEL_OUTOF10: 7
PAINLEVEL_OUTOF10: 9
PAINLEVEL_OUTOF10: 0
PAINLEVEL_OUTOF10: 8
PAINLEVEL_OUTOF10: 9

## 2017-09-26 ASSESSMENT — PAIN DESCRIPTION - PROGRESSION
CLINICAL_PROGRESSION: NOT CHANGED
CLINICAL_PROGRESSION: NOT CHANGED

## 2017-09-26 ASSESSMENT — PAIN DESCRIPTION - ORIENTATION: ORIENTATION: LEFT;ANTERIOR

## 2017-09-26 ASSESSMENT — PAIN DESCRIPTION - PAIN TYPE: TYPE: ACUTE PAIN

## 2017-09-26 ASSESSMENT — PAIN DESCRIPTION - LOCATION: LOCATION: FACE;NOSE

## 2017-09-26 NOTE — PLAN OF CARE
Problem: Falls - Risk of  Goal: Absence of falls  Outcome: Ongoing  Patient is a fall risk during this admission. Fall risk assessment was performed. Patient is absent of falls. Bed is in the lowest position. Wheels on the bed are locked. Call light and bed side table are within reach. Clutter is removed. Patient was educated to call out when needing assistance or wanting to get out of bed. Patient offered toileting assistance during rounding. Hourly rounds have been performed. Problem: Risk for Impaired Skin Integrity  Goal: Tissue integrity - skin and mucous membranes  Structural intactness and normal physiological function of skin and  mucous membranes. Outcome: Ongoing  Skin integrity intact. Mucous membranes pink, moist and intact. Patient turned every two hours. Skin and pressure ulcer assessment performed. Problem: Pain:  Goal: Pain level will decrease  Pain level will decrease   Outcome: Ongoing  Patient communicates presence of pain, pain characteristics, and pain relief response.

## 2017-09-26 NOTE — PROGRESS NOTES
Nutrition Assessment    Type and Reason for Visit: Initial, Consult (ONS recommendations)    Nutrition Recommendations: 1. Suggest adjusting to just clear liquid diet. Await advancement of diet when appropriate to 1,800 kcal carb controlled. 2. Consider ordering Ensure Clear ONS x 2/day. 3. Monitor BS's. Malnutrition Assessment:  · Malnutrition Status: At risk for malnutrition  · Findings of the 6 clinical characteristics of malnutrition (Minimum of 2 out of 6 clinical characteristics is required to make the diagnosis of moderate or severe Protein Calorie Malnutrition based on AND/ASPEN Guidelines):  1. Energy Intake-Not available, not able to assess    2. Weight Loss-2% loss or greater,  (in 14 months)  3. Fat Loss-No significant subcutaneous fat loss, Orbital  4. Muscle Loss-No significant muscle mass loss, Temples (temporalis muscle)  5. Fluid Accumulation-Mild fluid accumulation, Generalized  6.  Strength-Not measured    Nutrition Diagnosis:   · Problem: Increased nutrient needs  · Etiology: related to Endocrine dysfunction     Signs and symptoms:  as evidenced by Presence of wounds, Weight loss:  +5.2 kg--4.4% since 8/2/16, Localized or generalized fluid accumulation    Nutrition Assessment:  · Subjective Assessment: Per Pt:  +hands shaking, appetite is OK, but having problems with +constipation. Does not like most ONS's, that has tried in past.  Noted +wt loss of 5.2 kg (4.4%) since 8/2/16 (118.6 kg). This is not considered significant over time frame of x past 14 months.   · Nutrition-Focused Physical Findings: GI:  +rounded, +last BM (9/24), +active bowel sounds; PV:  +Generalized, +2, non-pitting; Skin:  +redness/skin abrasions, scattered, +PU, stage I to II, under breasts  · Wound Type: Multiple, Pressure Ulcer, Skin Tears  · Current Nutrition Therapies:  · Oral Diet Orders: Clear Liquid, Carb Control 4 Carbs/Meal   · Oral Diet intake: Unable to assess  · Anthropometric Measures:  · Ht: 5'

## 2017-09-26 NOTE — FLOWSHEET NOTE
Patient resting in bed; daughter at bedside assisting patient with cleaning her hand. Patient and daugther express no spiritual/emotional needs at this time; thank  for visiting. Patient appears to be confused.  provides presence ands support. Spiritual Care will follow as needed.      09/26/17 6006   Encounter Summary   Services provided to: Patient and family together   Referral/Consult From: [facility]   Continue Visiting (9/26/17)   Complexity of Encounter Low   Length of Encounter 15 minutes   Routine   Type Follow up   Assessment Approachable   Intervention Active listening   Outcome Expressed gratitude

## 2017-09-26 NOTE — PLAN OF CARE
Problem: Falls - Risk of  Goal: Absence of falls  Outcome: Ongoing    Problem: Risk for Impaired Skin Integrity  Goal: Tissue integrity - skin and mucous membranes  Structural intactness and normal physiological function of skin and  mucous membranes.    Outcome: Ongoing    Problem: Pain:  Goal: Control of acute pain  Control of acute pain   Outcome: Ongoing

## 2017-09-26 NOTE — PROGRESS NOTES
Cardiovascular progress Note    - Cross coverage for [place] [facility]    Patient name: Coleman Santo    YOB: 1940  Date of admission:  9/25/2017       Patient seen, examined. Previous clinical entries reviewed. All available laboratory, imaging and ancillary data reviewed. Subjective:      Coleman Santo is a 67 y.o. female, who follows with [facility] for her cardiac care, with past medical history significant for non-ischemic cardiomyopathy with normalized LVEF, chronic diastolic heart failure, and chronic atrial fibrillation. She takes warfarin for her high [id number] score, she has not tolerated NOAC's in the past. She presented to [facility]'s ER 9/25/17 with complaints of epistaxis. Cardiology is consulted for evaluation.     Hemodynamically stable. Atrial fibrillation, ventricular rates with sub-optimal control. At time of exam, 90's. Labs with Hg 7.8. INR reversed to 1.1. Renal function improving     She is still with good amount of facial discomfort. Denies chest pain, palpitations, or worsening dyspnea. She has chronic lymphedema.     Cardiac Data:  Coronary arteries:Normal coronaries per cath 6/2010  Structural Heart: Mild LVH, Mild aortic stenosis. Electrical Abnormalities: pSVT, New onset atrial fibrillation  Heart Failure: Chronic Diastolic Stage: C FC: III  Ventricular function: Hx of NICMP with Normalized LVEF, LVEF 81%, Grade I Diastolic Dysfunction    Systems review:  Constitutional: No fever/chills. HENT: No headache, neck pain or neck stiffness. No sore throat or dysphagia. Gastrointestinal: No abdominal pain, nausea or vomiting.    Cardiac: As Above  Respiratory: As above  Neurologic: No new focal weakness or numbness  Psychiatric: + Anxiety/Depression     Examination:   Vitals: BP (!) 123/59  Pulse 91  Temp 97.3 °F (36.3 °C) (Infrared)   Resp 12  Ht 5' 4\" (1.626 m)  Wt 250 lb (113.4 kg)  LMP  (LMP Unknown)  SpO2 (!) 87%  BMI 42.91 kg/m2    Intake/Output Summary (Last 24 hours) at allopurinol  100 mg Oral Daily    atorvastatin  40 mg Oral Daily    diltiazem  120 mg Oral Daily    metoprolol succinate  50 mg Oral Daily    venlafaxine  225 mg Oral Daily    insulin lispro  0-3 Units Subcutaneous Nightly    insulin lispro  0-6 Units Subcutaneous TID WC     Continuous Infusions:   sodium chloride 75 mL/hr at 09/25/17 2149    dextrose       Assessment/ Plan :     Epistaxis    -ENT following     Blood loss anemia    - Serial H/H     Atrial Fibrillation on warfarin therapy    - INR reversed, hold warfarin    - continue beta blocker, CCB for rate control     Chronic Diastolic Heart Failure      - Fairly compensated, follow fluid balance     ADRIANE     - Cr 1.7 on admit, now 1.4     Hypertension- Stable     Hyperlipidemia- Statin     Diabetes Mellitus     Morbid Obesity with CHARITO    Continue current management, supportive care. Thank you very much for allowing us to participate in the care of this patient. Please call us with any questions. Will discuss patient findings and plan of care with Dr. Manan Lo. Electronically signed by Gracy Pichardo CNP on 9/26/2017 at 8:00 AM    I have discussed the care of this patient including pertinent history and exam findings, reviewed the key elements of all parts of the encounter with Abimael Mckenzie CNP. I have seen and examined the patient independently. I agree with the assessment and plan as documented. Modifications were made as needed to the notes. Epistaxis better controlled. Ok to restart warfarin whenever ok with ENT. Continue other cardiac medications.     Electronically signed by Caden Bonilla MD on 9/26/2017 at 2:36 PM

## 2017-09-27 PROBLEM — J96.11 CHRONIC RESPIRATORY FAILURE WITH HYPOXIA (HCC): Status: ACTIVE | Noted: 2017-09-27

## 2017-09-27 PROBLEM — N17.9 AKI (ACUTE KIDNEY INJURY) (HCC): Status: ACTIVE | Noted: 2017-09-27

## 2017-09-27 LAB
ABSOLUTE EOS #: 0.33 K/UL (ref 0–0.4)
ABSOLUTE LYMPH #: 1.96 K/UL (ref 1–4.8)
ABSOLUTE MONO #: 0.98 K/UL (ref 0.2–0.8)
ANION GAP SERPL CALCULATED.3IONS-SCNC: 12 MMOL/L (ref 9–17)
BASOPHILS # BLD: 1 %
BASOPHILS ABSOLUTE: 0.11 K/UL (ref 0–0.2)
BUN BLDV-MCNC: 44 MG/DL (ref 8–23)
BUN/CREAT BLD: 32 (ref 9–20)
CALCIUM SERPL-MCNC: 8.7 MG/DL (ref 8.6–10.4)
CHLORIDE BLD-SCNC: 100 MMOL/L (ref 98–107)
CO2: 27 MMOL/L (ref 20–31)
CREAT SERPL-MCNC: 1.39 MG/DL (ref 0.5–0.9)
DIFFERENTIAL TYPE: ABNORMAL
EOSINOPHILS RELATIVE PERCENT: 3 %
GFR AFRICAN AMERICAN: 45 ML/MIN
GFR NON-AFRICAN AMERICAN: 37 ML/MIN
GFR SERPL CREATININE-BSD FRML MDRD: ABNORMAL ML/MIN/{1.73_M2}
GFR SERPL CREATININE-BSD FRML MDRD: ABNORMAL ML/MIN/{1.73_M2}
GLUCOSE BLD-MCNC: 149 MG/DL (ref 65–105)
GLUCOSE BLD-MCNC: 171 MG/DL (ref 65–105)
GLUCOSE BLD-MCNC: 178 MG/DL (ref 65–105)
GLUCOSE BLD-MCNC: 179 MG/DL (ref 70–99)
HCT VFR BLD CALC: 22.9 % (ref 36–46)
HCT VFR BLD CALC: 23.7 % (ref 36–46)
HCT VFR BLD CALC: 23.8 % (ref 36–46)
HEMOGLOBIN: 7 G/DL (ref 12–16)
HEMOGLOBIN: 7.4 G/DL (ref 12–16)
HEMOGLOBIN: 7.5 G/DL (ref 12–16)
LYMPHOCYTES # BLD: 18 %
MCH RBC QN AUTO: 24.8 PG (ref 26–34)
MCHC RBC AUTO-ENTMCNC: 31.1 G/DL (ref 31–37)
MCV RBC AUTO: 79.9 FL (ref 80–100)
MONOCYTES # BLD: 9 %
MORPHOLOGY: ABNORMAL
PDW BLD-RTO: 18.1 % (ref 11.5–14.5)
PLATELET # BLD: 265 K/UL (ref 130–400)
PLATELET ESTIMATE: ABNORMAL
PMV BLD AUTO: ABNORMAL FL (ref 6–12)
POTASSIUM SERPL-SCNC: 4.9 MMOL/L (ref 3.7–5.3)
RBC # BLD: 2.98 M/UL (ref 4–5.2)
RBC # BLD: ABNORMAL 10*6/UL
SEG NEUTROPHILS: 69 %
SEGMENTED NEUTROPHILS ABSOLUTE COUNT: 7.52 K/UL (ref 1.8–7.7)
SODIUM BLD-SCNC: 139 MMOL/L (ref 135–144)
WBC # BLD: 10.9 K/UL (ref 3.5–11)
WBC # BLD: ABNORMAL 10*3/UL

## 2017-09-27 PROCEDURE — 2060000000 HC ICU INTERMEDIATE R&B

## 2017-09-27 PROCEDURE — 97162 PT EVAL MOD COMPLEX 30 MIN: CPT

## 2017-09-27 PROCEDURE — 85014 HEMATOCRIT: CPT

## 2017-09-27 PROCEDURE — 6360000002 HC RX W HCPCS: Performed by: INTERNAL MEDICINE

## 2017-09-27 PROCEDURE — 85018 HEMOGLOBIN: CPT

## 2017-09-27 PROCEDURE — G8987 SELF CARE CURRENT STATUS: HCPCS

## 2017-09-27 PROCEDURE — 97116 GAIT TRAINING THERAPY: CPT

## 2017-09-27 PROCEDURE — 36415 COLL VENOUS BLD VENIPUNCTURE: CPT

## 2017-09-27 PROCEDURE — 97110 THERAPEUTIC EXERCISES: CPT

## 2017-09-27 PROCEDURE — 2580000003 HC RX 258: Performed by: INTERNAL MEDICINE

## 2017-09-27 PROCEDURE — G8979 MOBILITY GOAL STATUS: HCPCS

## 2017-09-27 PROCEDURE — G8988 SELF CARE GOAL STATUS: HCPCS

## 2017-09-27 PROCEDURE — 97530 THERAPEUTIC ACTIVITIES: CPT

## 2017-09-27 PROCEDURE — 97166 OT EVAL MOD COMPLEX 45 MIN: CPT

## 2017-09-27 PROCEDURE — G8978 MOBILITY CURRENT STATUS: HCPCS

## 2017-09-27 PROCEDURE — 97535 SELF CARE MNGMENT TRAINING: CPT

## 2017-09-27 PROCEDURE — 80048 BASIC METABOLIC PNL TOTAL CA: CPT

## 2017-09-27 PROCEDURE — 82947 ASSAY GLUCOSE BLOOD QUANT: CPT

## 2017-09-27 PROCEDURE — 6370000000 HC RX 637 (ALT 250 FOR IP): Performed by: INTERNAL MEDICINE

## 2017-09-27 PROCEDURE — 85025 COMPLETE CBC W/AUTO DIFF WBC: CPT

## 2017-09-27 RX ORDER — DOCUSATE SODIUM 100 MG/1
100 CAPSULE, LIQUID FILLED ORAL 2 TIMES DAILY
Status: DISCONTINUED | OUTPATIENT
Start: 2017-09-27 | End: 2017-10-02 | Stop reason: HOSPADM

## 2017-09-27 RX ADMIN — INSULIN LISPRO 1 UNITS: 100 INJECTION, SOLUTION INTRAVENOUS; SUBCUTANEOUS at 22:47

## 2017-09-27 RX ADMIN — INSULIN LISPRO 1 UNITS: 100 INJECTION, SOLUTION INTRAVENOUS; SUBCUTANEOUS at 17:50

## 2017-09-27 RX ADMIN — INSULIN LISPRO 1 UNITS: 100 INJECTION, SOLUTION INTRAVENOUS; SUBCUTANEOUS at 08:59

## 2017-09-27 RX ADMIN — ALLOPURINOL 100 MG: 100 TABLET ORAL at 08:58

## 2017-09-27 RX ADMIN — DILTIAZEM HYDROCHLORIDE 120 MG: 120 CAPSULE, COATED, EXTENDED RELEASE ORAL at 08:58

## 2017-09-27 RX ADMIN — CEFTRIAXONE SODIUM 1 G: 1 INJECTION, POWDER, FOR SOLUTION INTRAMUSCULAR; INTRAVENOUS at 09:15

## 2017-09-27 RX ADMIN — DOCUSATE SODIUM 100 MG: 100 CAPSULE, LIQUID FILLED ORAL at 19:48

## 2017-09-27 RX ADMIN — ATORVASTATIN CALCIUM 40 MG: 40 TABLET, FILM COATED ORAL at 08:58

## 2017-09-27 RX ADMIN — VENLAFAXINE HYDROCHLORIDE 225 MG: 75 CAPSULE, EXTENDED RELEASE ORAL at 08:58

## 2017-09-27 RX ADMIN — INSULIN LISPRO 1 UNITS: 100 INJECTION, SOLUTION INTRAVENOUS; SUBCUTANEOUS at 13:15

## 2017-09-27 RX ADMIN — ALPRAZOLAM 0.25 MG: 0.25 TABLET ORAL at 19:48

## 2017-09-27 RX ADMIN — Medication 0.5 MG: at 19:30

## 2017-09-27 RX ADMIN — Medication 0.5 MG: at 03:12

## 2017-09-27 RX ADMIN — METOPROLOL SUCCINATE 50 MG: 50 TABLET, FILM COATED, EXTENDED RELEASE ORAL at 08:58

## 2017-09-27 RX ADMIN — INSULIN GLARGINE 16 UNITS: 100 INJECTION, SOLUTION SUBCUTANEOUS at 22:47

## 2017-09-27 ASSESSMENT — PAIN SCALES - GENERAL
PAINLEVEL_OUTOF10: 7
PAINLEVEL_OUTOF10: 8
PAINLEVEL_OUTOF10: 0

## 2017-09-27 NOTE — PROGRESS NOTES
rails  Entrance Stairs - Number of Steps: 1  Bathroom Shower/Tub: Walk-in shower  Bathroom Toilet: Standard  Bathroom Equipment: Shower chair  Home Equipment: Cane, Rolling walker (home O2)  ADL Assistance: Independent  Homemaking Assistance: Needs assistance (cleaning lady; pt does some simple meals)  Homemaking Responsibilities: Yes (assist for laundry)  Ambulation Assistance: Independent  Transfer Assistance: Independent  Active : Yes (occasionally, but not much)     Objective     Observation/Palpation  Posture: Fair    AROM RLE (degrees)  RLE AROM: WFL  AROM LLE (degrees)  LLE AROM : WFL  AROM RUE (degrees)  RUE AROM : WFL  AROM LUE (degrees)  LUE AROM : WFL  Strength RLE  Strength RLE: WFL  Comment: 4/5  Strength LLE  Strength LLE: WFL  Comment: 4/5  Strength RUE  Strength RUE: WFL  Comment: 4/5  Strength LUE  Strength LUE: WFL  Comment: 4/5  Tone RLE  RLE Tone: Normotonic  Tone LLE  LLE Tone: Normotonic  Motor Control  Gross Motor?: WFL  Sensation  Overall Sensation Status: WFL  Bed mobility  Supine to Sit: Moderate assistance  Sit to Supine: Moderate assistance  Scooting: Moderate assistance  Transfers  Sit to Stand: Minimal Assistance  Stand to sit: Minimal Assistance  Ambulation  Ambulation?: Yes  Ambulation 1  Surface: level tile  Device: Hand-Held Assist  Assistance: Minimal assistance  Quality of Gait: Patient was only able to take 4 steps to head of bed with shuffling gait with decreased B step length and moderate unsteadiness due to SOB and fatigue  Distance: 4 steps to [facility]  Comments: Patient became anxious and nauseated upon sitting up on edge of bed and experienced some dry heaves. Patient tolerated sitting on the edge of bed for 8-10 minutes before becoming too fatigued to sit any longer.       Balance  Posture: Fair (Moderately forward flexed posture in sitting)  Sitting - Static: Fair;+  Sitting - Dynamic: Fair;+  Standing - Static: Fair  Standing - Dynamic: Fair  Exercises  Hip Flexion: Supine x 5 reps to B LE  Hip Abduction: Supine x 5 reps to B LE  Knee Long Arc Quad: Seated x 5 reps to B LE  Ankle Pumps: Supine x 10 reps to B LE  Comments: O2 sats dropping on and off throughout session with cues needed for pt to keep mask on and to utilize proper breathing techs     Assessment   Body structures, Functions, Activity limitations: Decreased functional mobility ; Decreased strength;Decreased endurance;Decreased safe awareness;Decreased balance    Patient tolerated session well with moderate deficits noted in bed mobility, transfers, ambulation, balance, and endurance this session. Patient demos significant desaturation throughout session which bounces back to above 90% within minutes of sitting and using O2 via face mask. At current level of function, patient will require a SNF to improve IND and safety as well as tolerance to all functional mobility after discharge from the hospital.     Prognosis: Good  Decision Making: Medium Complexity  Patient Education: PT POC, LE exercises to improve endurance  REQUIRES PT FOLLOW UP: Yes  Activity Tolerance  Activity Tolerance: Patient limited by fatigue;Patient limited by endurance;Treatment limited secondary to medical complications (free text)     Discharge Recommendations:  [place]  Times per week: 1-2 times a day, 5-6 days a week  Current Treatment Recommendations: Strengthening, Balance Training, Transfer Training, Endurance Training, Gait Training, Safety Education & Training  Safety Devices  Type of devices: All fall risk precautions in place, Call light within reach, Gait belt, Left in bed, Nurse notified    G-Code  PT G-Codes  Functional Assessment Tool Used: [facility] FO  Score: 10  Functional Limitation: Mobility: Walking and moving around  Mobility: Walking and Moving Around Current Status ():  At least 60 percent but less than 80 percent impaired, limited or restricted  Mobility: Walking and

## 2017-09-27 NOTE — PLAN OF CARE
Problem: Neurological  Intervention: OT Evaluation/treatment  PT/OT to evaluate patient some time during day shift 9/27/17. Both PT/OT evaluated and treated patient. Patient was able to sit on side of bed for a short amount of time. PT/OT to recommend schedule for continued increase in activity.

## 2017-09-27 NOTE — PROGRESS NOTES
Jennmoody Markmoody is doing well. Bleeding stopped. Will need to resume anticoagulation tomorrow with coumadin. NO asprin. Will plan on removing nasal packing on Friday. May DC home of Friday.

## 2017-09-27 NOTE — PROGRESS NOTES
Occupational Therapy   Occupational Therapy Initial Assessment  Date: 2017   Patient Name: Jyothi Corado  MRN: [id number]     : 1940     RN reports patient is medically stable for therapy treatment this date. Chart reviewed prior to treatment and patient is agreeable for therapy. All lines intact and patient positioned comfortably at end of treatment. All patient needs addressed prior to ending therapy session. Patient Diagnosis(es): The primary encounter diagnosis was Epistaxis. A diagnosis of Supratherapeutic INR was also pertinent to this visit. has a past medical history of Anxiety; Cancer [facility]); CHF (congestive heart failure) ([facility]ch[facility] disease[facility] Depression (emotion); Diabetes mellitus ([facility] Hyperlipidemia; Hypertension; Tachycardia; and Vertigo. has a past surgical history that includes Hysterectomy.            Restrictions  Restrictions/Precautions  Restrictions/Precautions: General Precautions, Fall Risk    Subjective   General  Chart Reviewed: Yes  Patient assessed for rehabilitation services?: Yes  Family / Caregiver Present: No  Pain Assessment  Patient Currently in Pain: No  Pain Assessment: 0-10  Pain Level: 0  Pain Type: Acute pain  Pain Location: Face, Nose  Pain Orientation: Left, Anterior  Pain Descriptors: Constant, Pressure, Burning  Pain Frequency: Continuous  Clinical Progression: Not changed  Patient's Stated Pain Goal: No pain  Effect of Pain on Daily Activities: limits activy, diet  Pain Intervention(s): Medication (see eMar)  Response to Pain Intervention: Asleep with RR greater than 10     Social/Functional History  Social/Functional History  Lives With: Son (who is an alcoholic; not reliable for helping pt. per daughter)  Type of Home: House  Home Layout: Multi-level (bi level;  5 steps to kitchen level; bed room on upper level)  Home Access: Stairs to enter without rails  Entrance Stairs - Number of Steps: 1  Bathroom Shower/Tub: Walk-in shower  Bathroom Toilet: Standard  Bathroom Equipment: Shower chair  Home Equipment: Cane, Rolling walker (home O2)  ADL Assistance: Independent  Homemaking Assistance: Needs assistance (cleaning lady; pt does some simple meals)  Homemaking Responsibilities: Yes (assist for laundry)  Ambulation Assistance: Independent  Transfer Assistance: Independent  Active : Yes (occasionally, but not much)       Objective        Orientation  Overall Orientation Status: Impaired  Orientation Level: Oriented to person;Oriented to place;Oriented to situation  Observation/Palpation  Posture: Fair  Balance  Sitting Balance: Stand by assistance  Standing Balance: Minimal assistance  Standing Balance  Sit to stand: Minimal assistance  Stand to sit: Minimal assistance  Functional Mobility  Functional - Mobility Device: No device  Assist Level: Minimal assistance  Functional Mobility Comments: pt only able to take several side steps along side of bed with CGA-min A; limited by weakness, fatigue, feeling sick. O2 sats dropping as well with cues provided for pt to keep mask on and to utilize proper breathing techs  ADL  Feeding: Setup;Supervision  Grooming: Minimal assistance  UE Bathing: Moderate assistance  LE Bathing: Maximum assistance  UE Dressing: Moderate assistance  LE Dressing: Maximum assistance  Toileting: Maximum assistance  Tone RUE  RUE Tone: Normotonic  Tone LUE  LUE Tone: Normotonic     Bed mobility  Supine to Sit: Moderate assistance  Sit to Supine: Moderate assistance  Scooting: Moderate assistance  Transfers  Sit to stand: Minimal assistance  Stand to sit: Minimal assistance     Cognition  Overall Cognitive Status: Exceptions  Following Commands:  Follows one step commands with repetition  Attention Span: Difficulty dividing attention  Memory: Decreased recall of recent events  Safety Judgement: Decreased awareness of need for safety;Decreased awareness of need for assistance  Problem Solving: Assistance required to generate solutions;Assistance required to implement solutions;Assistance required to identify errors made;Decreased awareness of errors  Insights: Decreased awareness of deficits  Initiation: Requires cues for some  Sequencing: Requires cues for some  Perception  Overall Perceptual Status: WFL     Sensation  Overall Sensation Status: WFL        LUE AROM (degrees)  LUE AROM : WFL  RUE AROM (degrees)  RUE AROM : WFL  LUE Strength  Gross LUE Strength:  (3+/5 BUE's)                  Assessment   Performance deficits / Impairments: Decreased functional mobility ; Decreased ADL status; Decreased strength;Decreased safe awareness;Decreased endurance;Decreased cognition;Decreased balance  Prognosis: Fair  Decision Making: Medium Complexity  Patient Education: OT POC, discharge recommendations, safety with transfers/standing, benefits of sitting up and being OOB  Discharge Recommendations: Subacute/Skilled Nursing Facility  REQUIRES OT FOLLOW UP: Yes  Activity Tolerance  Activity Tolerance: Patient limited by fatigue  Safety Devices  Safety Devices in place: Yes  Type of devices: Call light within reach;Nurse notified;Gait belt;Patient at risk for falls; Left in bed        Discharge Recommendations:  [facility]  Times per week: 4-5x/weel, 1-2x/day  Current Treatment Recommendations: Strengthening, Balance Training, Functional Mobility Training, Endurance Training, Patient/Caregiver Education & Training, Equipment Evaluation, Education, & procurement, Home Management Training, Safety Education & Training, Self-Care / ADL    G-Code  OT G-codes  Functional Assessment Tool Used: [employer] \"6 clicks\" Inpatient Daily Activity short form  Score: 13  Functional Limitation: Self care  Self Care Current Status (): At least 60 percent but less than 80 percent impaired, limited or restricted  Self Care Goal Status ():  At least 1 percent but less than 20 percent impaired, limited or restricted  OutComes Score                                           Goals  Short term goals  Time Frame for Short term goals: by discharge, pt will  Short term goal 1: demo SBA with ADL transfers with good safety  Short term goal 2: demo SBA with functional mob in room for simple ADL completion  Short term goal 3: demo min A with toileting routine  Short term goal 4: demo SBA with UB ADLs and min A LB ADLs with good safety and pacing  Short term goal 5: verb good understanding of EC/WS techs, fall prevention techs to use at home  Patient Goals   Patient goals : to go home       Therapy Time   Individual Concurrent Group Co-treatment   Time In [phone]         Time Out 0921         Minutes 28           Patient would benefit from SNF for continued occupational therapy to increase independence with  ADL of bathing, dressing, toileting and grooming. Writer recommending SNF placement for for activity tolerance and strength which will increase independence with ADL's coordinated with bed mobility and chair transfers. Continued skilled OT services to address decreased safety awareness with ADL and IADL tasks and for education and increased independence with DME and AE for fall prevention and ec/ws techniques prior to d/c home.        Dianna Allison, OT

## 2017-09-28 PROBLEM — K59.01 SLOW TRANSIT CONSTIPATION: Status: ACTIVE | Noted: 2017-09-28

## 2017-09-28 LAB
ABSOLUTE EOS #: 0.3 K/UL (ref 0–0.4)
ABSOLUTE LYMPH #: 1.6 K/UL (ref 1–4.8)
ABSOLUTE MONO #: 0.8 K/UL (ref 0.2–0.8)
ANION GAP SERPL CALCULATED.3IONS-SCNC: 11 MMOL/L (ref 9–17)
BASOPHILS # BLD: 1 %
BASOPHILS ABSOLUTE: 0.1 K/UL (ref 0–0.2)
BUN BLDV-MCNC: 42 MG/DL (ref 8–23)
BUN/CREAT BLD: 30 (ref 9–20)
CALCIUM SERPL-MCNC: 8.7 MG/DL (ref 8.6–10.4)
CHLORIDE BLD-SCNC: 99 MMOL/L (ref 98–107)
CO2: 27 MMOL/L (ref 20–31)
CREAT SERPL-MCNC: 1.42 MG/DL (ref 0.5–0.9)
DIFFERENTIAL TYPE: ABNORMAL
EOSINOPHILS RELATIVE PERCENT: 3 %
GFR AFRICAN AMERICAN: 43 ML/MIN
GFR NON-AFRICAN AMERICAN: 36 ML/MIN
GFR SERPL CREATININE-BSD FRML MDRD: ABNORMAL ML/MIN/{1.73_M2}
GFR SERPL CREATININE-BSD FRML MDRD: ABNORMAL ML/MIN/{1.73_M2}
GLUCOSE BLD-MCNC: 123 MG/DL (ref 65–105)
GLUCOSE BLD-MCNC: 132 MG/DL (ref 70–99)
GLUCOSE BLD-MCNC: 154 MG/DL (ref 65–105)
GLUCOSE BLD-MCNC: 157 MG/DL (ref 65–105)
HCT VFR BLD CALC: 21.7 % (ref 36–46)
HCT VFR BLD CALC: 22.2 % (ref 36–46)
HCT VFR BLD CALC: 23.3 % (ref 36–46)
HCT VFR BLD CALC: 26.2 % (ref 36–46)
HEMOGLOBIN: 6.8 G/DL (ref 12–16)
HEMOGLOBIN: 7 G/DL (ref 12–16)
HEMOGLOBIN: 7.4 G/DL (ref 12–16)
HEMOGLOBIN: 8.3 G/DL (ref 12–16)
INR BLD: 1
LYMPHOCYTES # BLD: 17 %
MCH RBC QN AUTO: 24.9 PG (ref 26–34)
MCHC RBC AUTO-ENTMCNC: 31.3 G/DL (ref 31–37)
MCV RBC AUTO: 79.7 FL (ref 80–100)
MONOCYTES # BLD: 8 %
PDW BLD-RTO: 17.7 % (ref 11.5–14.5)
PLATELET # BLD: 250 K/UL (ref 130–400)
PLATELET ESTIMATE: ABNORMAL
PMV BLD AUTO: ABNORMAL FL (ref 6–12)
POTASSIUM SERPL-SCNC: 5 MMOL/L (ref 3.7–5.3)
PROTHROMBIN TIME: 10.8 SEC (ref 9.7–11.6)
RBC # BLD: 2.73 M/UL (ref 4–5.2)
RBC # BLD: ABNORMAL 10*6/UL
SEG NEUTROPHILS: 71 %
SEGMENTED NEUTROPHILS ABSOLUTE COUNT: 6.8 K/UL (ref 1.8–7.7)
SODIUM BLD-SCNC: 137 MMOL/L (ref 135–144)
WBC # BLD: 9.6 K/UL (ref 3.5–11)
WBC # BLD: ABNORMAL 10*3/UL

## 2017-09-28 PROCEDURE — 85018 HEMOGLOBIN: CPT

## 2017-09-28 PROCEDURE — 85025 COMPLETE CBC W/AUTO DIFF WBC: CPT

## 2017-09-28 PROCEDURE — 85610 PROTHROMBIN TIME: CPT

## 2017-09-28 PROCEDURE — 97110 THERAPEUTIC EXERCISES: CPT

## 2017-09-28 PROCEDURE — 36430 TRANSFUSION BLD/BLD COMPNT: CPT

## 2017-09-28 PROCEDURE — 2580000003 HC RX 258: Performed by: INTERNAL MEDICINE

## 2017-09-28 PROCEDURE — 6370000000 HC RX 637 (ALT 250 FOR IP): Performed by: INTERNAL MEDICINE

## 2017-09-28 PROCEDURE — 85014 HEMATOCRIT: CPT

## 2017-09-28 PROCEDURE — 86900 BLOOD TYPING SEROLOGIC ABO: CPT

## 2017-09-28 PROCEDURE — 82947 ASSAY GLUCOSE BLOOD QUANT: CPT

## 2017-09-28 PROCEDURE — 2060000000 HC ICU INTERMEDIATE R&B

## 2017-09-28 PROCEDURE — 6360000002 HC RX W HCPCS: Performed by: INTERNAL MEDICINE

## 2017-09-28 PROCEDURE — 80048 BASIC METABOLIC PNL TOTAL CA: CPT

## 2017-09-28 PROCEDURE — 97530 THERAPEUTIC ACTIVITIES: CPT

## 2017-09-28 PROCEDURE — 36415 COLL VENOUS BLD VENIPUNCTURE: CPT

## 2017-09-28 PROCEDURE — P9016 RBC LEUKOCYTES REDUCED: HCPCS

## 2017-09-28 RX ORDER — SENNA PLUS 8.6 MG/1
2 TABLET ORAL NIGHTLY
Status: DISCONTINUED | OUTPATIENT
Start: 2017-09-28 | End: 2017-10-02 | Stop reason: HOSPADM

## 2017-09-28 RX ORDER — HYDROCODONE BITARTRATE AND ACETAMINOPHEN 5; 325 MG/1; MG/1
1 TABLET ORAL EVERY 6 HOURS PRN
Status: DISCONTINUED | OUTPATIENT
Start: 2017-09-28 | End: 2017-09-29

## 2017-09-28 RX ADMIN — Medication 0.5 MG: at 08:24

## 2017-09-28 RX ADMIN — ATORVASTATIN CALCIUM 40 MG: 40 TABLET, FILM COATED ORAL at 08:31

## 2017-09-28 RX ADMIN — Medication 0.5 MG: at 18:10

## 2017-09-28 RX ADMIN — ALPRAZOLAM 0.25 MG: 0.25 TABLET ORAL at 10:19

## 2017-09-28 RX ADMIN — CEFTRIAXONE SODIUM 1 G: 1 INJECTION, POWDER, FOR SOLUTION INTRAMUSCULAR; INTRAVENOUS at 08:35

## 2017-09-28 RX ADMIN — INSULIN LISPRO 1 UNITS: 100 INJECTION, SOLUTION INTRAVENOUS; SUBCUTANEOUS at 12:04

## 2017-09-28 RX ADMIN — INSULIN LISPRO 1 UNITS: 100 INJECTION, SOLUTION INTRAVENOUS; SUBCUTANEOUS at 21:35

## 2017-09-28 RX ADMIN — DOCUSATE SODIUM 100 MG: 100 CAPSULE, LIQUID FILLED ORAL at 08:31

## 2017-09-28 RX ADMIN — METOPROLOL SUCCINATE 50 MG: 50 TABLET, FILM COATED, EXTENDED RELEASE ORAL at 08:30

## 2017-09-28 RX ADMIN — Medication 10 ML: at 18:11

## 2017-09-28 RX ADMIN — DOCUSATE SODIUM 100 MG: 100 CAPSULE, LIQUID FILLED ORAL at 21:35

## 2017-09-28 RX ADMIN — ALPRAZOLAM 0.25 MG: 0.25 TABLET ORAL at 21:34

## 2017-09-28 RX ADMIN — DILTIAZEM HYDROCHLORIDE 120 MG: 120 CAPSULE, COATED, EXTENDED RELEASE ORAL at 08:30

## 2017-09-28 RX ADMIN — VENLAFAXINE HYDROCHLORIDE 225 MG: 75 CAPSULE, EXTENDED RELEASE ORAL at 08:31

## 2017-09-28 RX ADMIN — ALLOPURINOL 100 MG: 100 TABLET ORAL at 08:31

## 2017-09-28 RX ADMIN — SENNOSIDES 17.2 MG: 8.6 TABLET, FILM COATED ORAL at 21:33

## 2017-09-28 RX ADMIN — HYDROCODONE BITARTRATE AND ACETAMINOPHEN 1 TABLET: 5; 325 TABLET ORAL at 10:19

## 2017-09-28 RX ADMIN — INSULIN GLARGINE 16 UNITS: 100 INJECTION, SOLUTION SUBCUTANEOUS at 21:34

## 2017-09-28 ASSESSMENT — PAIN SCALES - GENERAL
PAINLEVEL_OUTOF10: 9
PAINLEVEL_OUTOF10: 4

## 2017-09-28 ASSESSMENT — PAIN DESCRIPTION - ONSET: ONSET: ON-GOING

## 2017-09-28 ASSESSMENT — PAIN DESCRIPTION - ORIENTATION
ORIENTATION: LEFT;ANTERIOR
ORIENTATION: LEFT;ANTERIOR

## 2017-09-28 ASSESSMENT — PAIN DESCRIPTION - LOCATION
LOCATION: FACE;NOSE
LOCATION: FACE;NOSE

## 2017-09-28 ASSESSMENT — PAIN DESCRIPTION - PAIN TYPE
TYPE: CHRONIC PAIN
TYPE: ACUTE PAIN

## 2017-09-28 ASSESSMENT — PAIN DESCRIPTION - DESCRIPTORS: DESCRIPTORS: CONSTANT

## 2017-09-28 ASSESSMENT — PAIN DESCRIPTION - FREQUENCY: FREQUENCY: CONTINUOUS

## 2017-09-28 ASSESSMENT — PAIN DESCRIPTION - PROGRESSION: CLINICAL_PROGRESSION: NOT CHANGED

## 2017-09-28 NOTE — PROGRESS NOTES
Physical Therapy  Mishel Carbajalbucky      09/28/17 1002   Restrictions/Precautions   Restrictions/Precautions General Precautions; Fall Risk   General   Response To Previous Treatment Patient reporting fatigue but able to participate. Subjective   Subjective Patient did not speak much d/t O2 facial mask and fatigue. General Comment   Comments RN, Leola Russo reports that patient will be recieveing blood soon d/t low Hbg (6.9), Agreed upon bed exercises only. Pain Screening   Patient Currently in Pain Yes   Pain Assessment   Pain Assessment 0-10   Pain Level 9   Pain Type Chronic pain   Pain Location Face;Nose   Pain Orientation Left; Anterior   Oxygen Therapy   SpO2 100 %   Pulse Oximeter Device Mode Continuous   Pulse Oximeter Device Location Finger   O2 Device Venturi mask   Bed Mobility   Bridging Maximum assistance   Rolling Minimal assistance   Scooting Minimal assistance  (x 2)   Comment Patient performed bed mobility for repositioning. Patient able to follow commands and particiapte. Patient sat up in the and rocked back and forth, reporting she was just trying to move around and get comfortable. Ambulation   Ambulation? No   Exercises   Hip Flexion Supine x 10 reps to B LE   Hip Abduction Supine x 10 reps to B LE   Knee Long Arc Quad Seated x 10 reps to B LE   Comments O2 stats remain WNL, VCs for pursed lip breathing, keep mask on, and VCs to keep performing exercises.     Patient Goals    Patient goals  To get strong enough to go home   Short term goals   Time Frame for Short term goals 14 visits   Short term goal 1 Patient will be IND with bed mobility   Short term goal 2 Patient will be IND with transfers to promote safe access to bathroom   Short term goal 3 Patient will amb IND with appropriate device on level surfaces without LOB   Short term goal 4 Patient will demo 'good' sitting and standing balance   Conditions Requiring Skilled Therapeutic Intervention   Body structures, Functions, Activity

## 2017-09-28 NOTE — PROGRESS NOTES
Cardiovascular progress Note  - Coverage for SPECIALTY [facility]    Patient name: Gala Dominguez    YOB: 1940  Date of admission:  9/25/2017       Patient seen, examined. Previous clinical entries reviewed. All available laboratory, imaging and ancillary data reviewed. Subjective:      Vitaliy Lindquist is a 67 y. o. female, who follows with [facility] for her cardiac care, with past medical history significant for non-ischemic cardiomyopathy with normalized LVEF, chronic diastolic heart failure, and chronic atrial fibrillation. She takes warfarin for her high [id number]-KDTc score, she has not tolerated NOAC's in the past. She presented to [address] [place] [address] ER 9/25/17 with complaints of epistaxis. Cardiology is following.      Hemodynamically stable. Atrial fibrillation with controlled ventricular response. Labs with Hg 6.8. She will receive PRBC.      Still with facial pain. She denies chest pain, palpitations, or worsening dyspnea. She has chronic lymphedema.      Cardiac Data:  Coronary arteries:Normal coronaries per cath 6/2010  Structural Heart: Mild LVH, Mild aortic stenosis. Electrical Abnormalities: pSVT, New onset atrial fibrillation  Heart Failure: Chronic Diastolic Stage: C FC: III  Ventricular function: Hx of NICMP with Normalized LVEF, LVEF 73%, Grade I Diastolic Dysfunction    Systems review:  Constitutional: No fever/chills. HENT: No headache, neck pain or neck stiffness. No sore throat or dysphagia. Gastrointestinal: No abdominal pain, nausea or vomiting.    Cardiac: As Above  Respiratory: As above  Neurologic: No new focal weakness or numbness  Psychiatric: + Anxiety/Depression     Examination:   Vitals: BP (!) 106/45  Pulse 83  Temp 98 °F (36.7 °C) (Infrared)   Resp 18  Ht 5' 4\" (1.626 m)  Wt 250 lb (113.4 kg)  LMP  (LMP Unknown)  SpO2 100%  BMI 42.91 kg/m2    Intake/Output Summary (Last 24 hours) at 09/28/17 0806  Last data filed at 09/27/17 1700   Gross per 24 hour   Intake              944 ml Output              400 ml   Net              544 ml     General appearance: Comfortable in no apparent distress. HEENT: No pallor. No icterus + packing to L nares  Neck: Supple. Lungs:Generally decreased breath sounds  Heart: Irregular S1,S2  Abdomen: Obese, soft, non-tender  Extremities: + peripheral edema- chronic LLE lymphedema  Skin: No obvious rashes. Musculoskeletal: No obvious deformities. Neurologic: No focal deficits. Labs/ Ancillary data:     CBC:   Recent Labs      09/27/17   0619   09/27/17 2007 09/27/17   2353  09/28/17 0439   WBC  10.9   --    --    --   9.6   HGB  7.4*   < >  7.0*  7.0*  6.8*   PLT  265   --    --    --   250    < > = values in this interval not displayed. BMP:  Recent Labs      09/26/17   0456  09/27/17   0619  09/28/17   0439   NA  140  139  137   K  4.9  4.9  5.0   CL  100  100  99   CO2  28 27 27   BUN  52*  44*  42*   CREATININE  1.42*  1.39*  1.42*   GLUCOSE  193*  179*  132*     INR:   Recent Labs      09/25/17   1153  09/26/17   0456  09/28/17   0439   INR  1.5  1.1  1.0     Echo: 6/2016   Reported as:   Left ventricle is normal in size. Mild left ventricular hypertrophy. Global left ventricular systolic function is normal.  Estimated ejection fraction is 55 % . Grade I (mild) left ventricular diastolic dysfunction. Left atrium is mildly dilated. Aortic leaflet calcification with mild stenosis. Peak instantaneous gradient 22 mmHg and mean gradient 11 mmHg. Trivial aortic insufficiency. Mitral valve sclerosis without stenosis. Trivial mitral regurgitation. Multiple MR jets noted. No significant pericardial effusion is seen.     Medications:   Scheduled Meds:   docusate sodium  100 mg Oral BID    insulin glargine  16 Units Subcutaneous Nightly    sodium chloride  250 mL Intravenous Once    tranexamic acid  500 mg Topical Once    sterile water  5 mL Injection Once    sodium chloride flush  10 mL Intravenous 2 times per day    cefTRIAXone

## 2017-09-28 NOTE — FLOWSHEET NOTE
Patient sleeping. No family present. Writer offers silent prayer, and leaves note of support. Spiritual Care will follow as needed.      09/28/17 0918   Encounter Summary   Services provided to: Patient   Referral/Consult From: Rounding   Continue Visiting (9/28/17; sleeping, left note)   Complexity of Encounter Low   Length of Encounter 15 minutes   Routine   Type Follow up   Assessment Sleeping   Intervention Prayer   Outcome Did not respond

## 2017-09-28 NOTE — PROGRESS NOTES
Follow up epistaxis  No more nasal bleeding left nostril was packed hemoglobin was low today needed 1 unit packed RBCs  Review of systems no fever no chills no GI or  complaints no cardiopulmonary complaints no TIA no active bleeding no polyuria or polydipsia complaints of constipation  Physical exam vitals stable  Eyes mild pallor no jaundice, heart regular rate and rhythm, head normocephalic and atraumatic  Lungs clear to auscultation percussion  Abdomen positive bowel sounds no tenderness no rebound  Extremities good pulses no edema no Homans sign  Assessment and plan  Acute kidney injury better  Acute blood loss anemia needed one prbc nit transfusion  Left nostril epistaxis resolved  Morbid obesity  COPD with chronic respiratory failure  Atrial fibrillation  Constipation   meds labs reviewed 1 unit packed RBCs given recheck H&H and BMP in the morning add Colace and Senokot see orders  Type 2 diabetes controlled

## 2017-09-29 ENCOUNTER — APPOINTMENT (OUTPATIENT)
Dept: GENERAL RADIOLOGY | Age: 77
DRG: 813 | End: 2017-09-29
Payer: MEDICARE

## 2017-09-29 LAB
ABO/RH: NORMAL
ABSOLUTE EOS #: 0.3 K/UL (ref 0–0.4)
ABSOLUTE LYMPH #: 1.4 K/UL (ref 1–4.8)
ABSOLUTE MONO #: 0.5 K/UL (ref 0.2–0.8)
ANION GAP SERPL CALCULATED.3IONS-SCNC: 13 MMOL/L (ref 9–17)
ANTIBODY SCREEN: NEGATIVE
ARM BAND NUMBER: NORMAL
BASOPHILS # BLD: 0 %
BASOPHILS ABSOLUTE: 0 K/UL (ref 0–0.2)
BLD PROD TYP BPU: NORMAL
BUN BLDV-MCNC: 45 MG/DL (ref 8–23)
BUN/CREAT BLD: 31 (ref 9–20)
CALCIUM SERPL-MCNC: 8.4 MG/DL (ref 8.6–10.4)
CHLORIDE BLD-SCNC: 97 MMOL/L (ref 98–107)
CO2: 24 MMOL/L (ref 20–31)
CREAT SERPL-MCNC: 1.45 MG/DL (ref 0.5–0.9)
CROSSMATCH RESULT: NORMAL
D-DIMER QUANTITATIVE: 3.07 MG/L FEU
DIFFERENTIAL TYPE: ABNORMAL
DISPENSE STATUS BLOOD BANK: NORMAL
EOSINOPHILS RELATIVE PERCENT: 4 %
EXPIRATION DATE: NORMAL
GFR AFRICAN AMERICAN: 42 ML/MIN
GFR NON-AFRICAN AMERICAN: 35 ML/MIN
GFR SERPL CREATININE-BSD FRML MDRD: ABNORMAL ML/MIN/{1.73_M2}
GFR SERPL CREATININE-BSD FRML MDRD: ABNORMAL ML/MIN/{1.73_M2}
GLUCOSE BLD-MCNC: 123 MG/DL (ref 70–99)
GLUCOSE BLD-MCNC: 153 MG/DL (ref 65–105)
GLUCOSE BLD-MCNC: 163 MG/DL (ref 65–105)
GLUCOSE BLD-MCNC: 187 MG/DL (ref 65–105)
HCT VFR BLD CALC: 25.4 % (ref 36–46)
HCT VFR BLD CALC: 26.3 % (ref 36–46)
HCT VFR BLD CALC: 27.2 % (ref 36–46)
HEMOGLOBIN: 7.9 G/DL (ref 12–16)
HEMOGLOBIN: 8.4 G/DL (ref 12–16)
HEMOGLOBIN: 8.6 G/DL (ref 12–16)
INR BLD: 1.2
LYMPHOCYTES # BLD: 17 %
MCH RBC QN AUTO: 25.8 PG (ref 26–34)
MCHC RBC AUTO-ENTMCNC: 31.3 G/DL (ref 31–37)
MCV RBC AUTO: 82.5 FL (ref 80–100)
MONOCYTES # BLD: 7 %
PDW BLD-RTO: 18.3 % (ref 11.5–14.5)
PLATELET # BLD: 212 K/UL (ref 130–400)
PLATELET ESTIMATE: ABNORMAL
PMV BLD AUTO: 6.8 FL (ref 6–12)
POTASSIUM SERPL-SCNC: 4.6 MMOL/L (ref 3.7–5.3)
PROTHROMBIN TIME: 12 SEC (ref 9.7–11.6)
RBC # BLD: 3.08 M/UL (ref 4–5.2)
RBC # BLD: ABNORMAL 10*6/UL
SEG NEUTROPHILS: 72 %
SEGMENTED NEUTROPHILS ABSOLUTE COUNT: 5.6 K/UL (ref 1.8–7.7)
SODIUM BLD-SCNC: 134 MMOL/L (ref 135–144)
TRANSFUSION STATUS: NORMAL
UNIT DIVISION: 0
UNIT NUMBER: NORMAL
WBC # BLD: 7.9 K/UL (ref 3.5–11)
WBC # BLD: ABNORMAL 10*3/UL

## 2017-09-29 PROCEDURE — 71010 XR CHEST PORTABLE: CPT

## 2017-09-29 PROCEDURE — 80048 BASIC METABOLIC PNL TOTAL CA: CPT

## 2017-09-29 PROCEDURE — 85379 FIBRIN DEGRADATION QUANT: CPT

## 2017-09-29 PROCEDURE — 85610 PROTHROMBIN TIME: CPT

## 2017-09-29 PROCEDURE — 6370000000 HC RX 637 (ALT 250 FOR IP): Performed by: OTOLARYNGOLOGY

## 2017-09-29 PROCEDURE — 36415 COLL VENOUS BLD VENIPUNCTURE: CPT

## 2017-09-29 PROCEDURE — 82947 ASSAY GLUCOSE BLOOD QUANT: CPT

## 2017-09-29 PROCEDURE — 2500000003 HC RX 250 WO HCPCS: Performed by: INTERNAL MEDICINE

## 2017-09-29 PROCEDURE — 85025 COMPLETE CBC W/AUTO DIFF WBC: CPT

## 2017-09-29 PROCEDURE — 85014 HEMATOCRIT: CPT

## 2017-09-29 PROCEDURE — 85018 HEMOGLOBIN: CPT

## 2017-09-29 PROCEDURE — 6370000000 HC RX 637 (ALT 250 FOR IP): Performed by: INTERNAL MEDICINE

## 2017-09-29 PROCEDURE — 2060000000 HC ICU INTERMEDIATE R&B

## 2017-09-29 PROCEDURE — 97116 GAIT TRAINING THERAPY: CPT

## 2017-09-29 PROCEDURE — 94640 AIRWAY INHALATION TREATMENT: CPT

## 2017-09-29 PROCEDURE — 97530 THERAPEUTIC ACTIVITIES: CPT

## 2017-09-29 PROCEDURE — 6360000002 HC RX W HCPCS: Performed by: INTERNAL MEDICINE

## 2017-09-29 PROCEDURE — 2580000003 HC RX 258: Performed by: INTERNAL MEDICINE

## 2017-09-29 RX ORDER — FUROSEMIDE 10 MG/ML
20 INJECTION INTRAMUSCULAR; INTRAVENOUS ONCE
Status: COMPLETED | OUTPATIENT
Start: 2017-09-29 | End: 2017-09-29

## 2017-09-29 RX ORDER — OXYMETAZOLINE HYDROCHLORIDE 0.05 G/100ML
2 SPRAY NASAL 2 TIMES DAILY
Status: DISPENSED | OUTPATIENT
Start: 2017-09-29 | End: 2017-10-02

## 2017-09-29 RX ORDER — LACTULOSE 10 G/15ML
20 SOLUTION ORAL 2 TIMES DAILY
Status: DISCONTINUED | OUTPATIENT
Start: 2017-09-29 | End: 2017-10-02 | Stop reason: HOSPADM

## 2017-09-29 RX ORDER — IPRATROPIUM BROMIDE AND ALBUTEROL SULFATE 2.5; .5 MG/3ML; MG/3ML
1 SOLUTION RESPIRATORY (INHALATION) EVERY 4 HOURS
Status: DISCONTINUED | OUTPATIENT
Start: 2017-09-30 | End: 2017-10-02 | Stop reason: HOSPADM

## 2017-09-29 RX ORDER — HEPARIN SODIUM 5000 [USP'U]/ML
5000 INJECTION, SOLUTION INTRAVENOUS; SUBCUTANEOUS EVERY 8 HOURS SCHEDULED
Status: DISCONTINUED | OUTPATIENT
Start: 2017-09-29 | End: 2017-09-30

## 2017-09-29 RX ORDER — OXYMETAZOLINE HYDROCHLORIDE 0.05 G/100ML
2 SPRAY NASAL 2 TIMES DAILY
Status: DISCONTINUED | OUTPATIENT
Start: 2017-09-29 | End: 2017-09-29 | Stop reason: SDUPTHER

## 2017-09-29 RX ORDER — SODIUM CHLORIDE/ALOE VERA
GEL (GRAM) NASAL 2 TIMES DAILY
Status: DISCONTINUED | OUTPATIENT
Start: 2017-09-29 | End: 2017-10-02 | Stop reason: HOSPADM

## 2017-09-29 RX ORDER — WARFARIN SODIUM 5 MG/1
5 TABLET ORAL DAILY
Status: DISCONTINUED | OUTPATIENT
Start: 2017-09-29 | End: 2017-10-02 | Stop reason: HOSPADM

## 2017-09-29 RX ADMIN — OXYMETAZOLINE HYDROCHLORIDE 2 SPRAY: 0.05 SPRAY NASAL at 12:01

## 2017-09-29 RX ADMIN — Medication 10 ML: at 21:00

## 2017-09-29 RX ADMIN — WARFARIN SODIUM 5 MG: 5 TABLET ORAL at 18:15

## 2017-09-29 RX ADMIN — DILTIAZEM HYDROCHLORIDE 120 MG: 120 CAPSULE, COATED, EXTENDED RELEASE ORAL at 09:26

## 2017-09-29 RX ADMIN — SODIUM CHLORIDE: 9 INJECTION, SOLUTION INTRAVENOUS at 09:43

## 2017-09-29 RX ADMIN — VENLAFAXINE HYDROCHLORIDE 225 MG: 75 CAPSULE, EXTENDED RELEASE ORAL at 09:26

## 2017-09-29 RX ADMIN — Medication: at 18:19

## 2017-09-29 RX ADMIN — ATORVASTATIN CALCIUM 40 MG: 40 TABLET, FILM COATED ORAL at 09:26

## 2017-09-29 RX ADMIN — INSULIN LISPRO 1 UNITS: 100 INJECTION, SOLUTION INTRAVENOUS; SUBCUTANEOUS at 12:38

## 2017-09-29 RX ADMIN — OXYMETAZOLINE HYDROCHLORIDE 2 SPRAY: 0.05 SPRAY NASAL at 21:21

## 2017-09-29 RX ADMIN — METOPROLOL SUCCINATE 50 MG: 50 TABLET, FILM COATED, EXTENDED RELEASE ORAL at 09:26

## 2017-09-29 RX ADMIN — CEFTRIAXONE SODIUM 1 G: 1 INJECTION, POWDER, FOR SOLUTION INTRAMUSCULAR; INTRAVENOUS at 09:44

## 2017-09-29 RX ADMIN — INSULIN LISPRO 1 UNITS: 100 INJECTION, SOLUTION INTRAVENOUS; SUBCUTANEOUS at 21:21

## 2017-09-29 RX ADMIN — SENNOSIDES 17.2 MG: 8.6 TABLET, FILM COATED ORAL at 20:59

## 2017-09-29 RX ADMIN — LACTULOSE 20 G: 20 SOLUTION ORAL at 20:59

## 2017-09-29 RX ADMIN — FUROSEMIDE 20 MG: 10 INJECTION, SOLUTION INTRAMUSCULAR; INTRAVENOUS at 23:30

## 2017-09-29 RX ADMIN — HEPARIN SODIUM 5000 UNITS: 5000 INJECTION, SOLUTION INTRAVENOUS; SUBCUTANEOUS at 21:21

## 2017-09-29 RX ADMIN — Medication 0.5 MG: at 09:48

## 2017-09-29 RX ADMIN — INSULIN GLARGINE 16 UNITS: 100 INJECTION, SOLUTION SUBCUTANEOUS at 21:21

## 2017-09-29 RX ADMIN — IPRATROPIUM BROMIDE AND ALBUTEROL SULFATE 1 AMPULE: .5; 3 SOLUTION RESPIRATORY (INHALATION) at 23:44

## 2017-09-29 RX ADMIN — INSULIN LISPRO 1 UNITS: 100 INJECTION, SOLUTION INTRAVENOUS; SUBCUTANEOUS at 17:00

## 2017-09-29 RX ADMIN — MICONAZOLE NITRATE: 1.42 POWDER TOPICAL at 09:29

## 2017-09-29 RX ADMIN — FUROSEMIDE 20 MG: 10 INJECTION, SOLUTION INTRAMUSCULAR; INTRAVENOUS at 21:46

## 2017-09-29 RX ADMIN — ALLOPURINOL 100 MG: 100 TABLET ORAL at 09:26

## 2017-09-29 RX ADMIN — HYDROCODONE BITARTRATE AND ACETAMINOPHEN 1 TABLET: 5; 325 TABLET ORAL at 04:11

## 2017-09-29 RX ADMIN — DOCUSATE SODIUM 100 MG: 100 CAPSULE, LIQUID FILLED ORAL at 20:59

## 2017-09-29 RX ADMIN — MICONAZOLE NITRATE: 1.42 POWDER TOPICAL at 21:00

## 2017-09-29 RX ADMIN — Medication: at 12:01

## 2017-09-29 RX ADMIN — ACETAMINOPHEN 650 MG: 325 TABLET ORAL at 23:47

## 2017-09-29 RX ADMIN — DOCUSATE SODIUM 100 MG: 100 CAPSULE, LIQUID FILLED ORAL at 09:26

## 2017-09-29 RX ADMIN — ALPRAZOLAM 0.25 MG: 0.25 TABLET ORAL at 09:29

## 2017-09-29 ASSESSMENT — PAIN DESCRIPTION - LOCATION: LOCATION: SHOULDER

## 2017-09-29 ASSESSMENT — PAIN DESCRIPTION - ORIENTATION: ORIENTATION: RIGHT

## 2017-09-29 ASSESSMENT — PAIN DESCRIPTION - PAIN TYPE: TYPE: CHRONIC PAIN

## 2017-09-29 ASSESSMENT — PAIN SCALES - GENERAL
PAINLEVEL_OUTOF10: 0
PAINLEVEL_OUTOF10: 0
PAINLEVEL_OUTOF10: 8
PAINLEVEL_OUTOF10: 8
PAINLEVEL_OUTOF10: 0
PAINLEVEL_OUTOF10: 6

## 2017-09-29 NOTE — PROGRESS NOTES
Pt doing well with no further bleeding in the last few days. Blood pressure 114/65, pulse 72, temperature 97.7 °F (36.5 °C), temperature source Infrared, resp. rate 19, height 5' 4\" (1.626 m), weight 250 lb (113.4 kg), SpO2 94 %, not currently breastfeeding.     Nose- Left nasal packing in place with no active bleeding  Oral- no active bleeding in pharynx    Procedure:  Left Nasal Packing removed without difficulty  Afrin NS sprayed bilaterally      Assessment  Epistaxis - resolved    Plan  Afrin NS 2 sprays BID for 3 days  Saline nasal gel bilaterally to both nostrils BID indefinitely for nasal moisture and prevention  OK to restart coumadin

## 2017-09-29 NOTE — PROGRESS NOTES
Physical Therapy  Facility/Department: [facility] ICU  Daily Treatment Note  NAME: Yandy Blackburn  : 1940  MRN: [id number]    Date of Service: 2017    Patient Diagnosis(es):   Patient Active Problem List    Diagnosis Date Noted    COPD exacerbation ([address] 2015     Priority: High     Class: Acute    Diabetes mellitus with hyperglycemia ([address] 2015     Priority: Medium     Class: Chronic    Hypertension 2015     Priority: Medium     Class: Chronic    Hyperlipidemia 2015     Priority: Medium     Class: Chronic    Depression 2015     Priority: Medium     Class: Chronic    Slow transit constipation 2017    ADRIANE (acute kidney injury) ([address] 2017    Chronic respiratory failure with hypoxia ([address] 2017    Epistaxis 2017    Acute blood loss anemia 2017    CHF (congestive heart failure) ([address] 2016    Asthmatic bronchitis with exacerbation     Hypomagnesemia     Type 2 diabetes mellitus without complication, with long-term current use of insulin ([facility])     Paroxysmal SVT (supraventricular tachycardia) ([address]     Arthritis of right shoulder region 2015    Acute kidney failure, unspecified ([address] 2015    UTI (urinary tract infection) 2015    Arthritis of right knee 2015    Gout attack 2015    Morbid obesity ([address] 2015       Past Medical History:   Diagnosis Date    Anxiety     Cancer ([address]     cervical    CHF (congestive heart failure) ([address]     COPD (chronic obstructive pulmonary disease) ([address]     Depression (emotion)     Diabetes mellitus ([address]     Hyperlipidemia     Hypertension     Tachycardia     Vertigo      Past Surgical History:   Procedure Laterality Date    HYSTERECTOMY         Restrictions  Restrictions/Precautions  Restrictions/Precautions: General Precautions, Fall Risk  Subjective   General  Chart Reviewed: Yes  Response To Previous Treatment: Patient with no complaints from previous session. Family / Caregiver Present: No  Subjective  Subjective: Patient agreeable to PT, but flat affect. General Comment  Comments: Elizabeth Acosta RN reports patient appropriate for PT and would benefit from sitting up in a chair. Pain Screening  Patient Currently in Pain: Denies  Vital Signs  Patient Currently in Pain: Denies       Orientation  Orientation  Overall Orientation Status: Within Functional Limits  Objective   Bed mobility  Rolling to Left: Minimal assistance  Supine to Sit: Minimal assistance  Scooting: Minimal assistance     Ambulation  Ambulation?: Yes  Ambulation 1  Surface: level tile  Device: Rolling Walker  Assistance: Minimal assistance  Quality of Gait: Patient slow, butt steady with ambulation. Distance: 12 feet forward and back     Balance  Posture: Good  Sitting - Static: Good  Sitting - Dynamic: Good  Standing - Static: Fair;+ (RW)  Standing - Dynamic: Fair (RW)  Exercises  Hip Flexion: 20x  Knee Long Arc Quad: 20x  Ankle Pumps: 20x                        Assessment   Body structures, Functions, Activity limitations: Decreased functional mobility ; Decreased strength;Decreased endurance;Decreased safe awareness;Decreased balance  Assessment: Recommend SNF due to patient unable to ambulate functional distances.   Prognosis: Good  REQUIRES PT FOLLOW UP: Yes  Activity Tolerance  Activity Tolerance: Patient Tolerated treatment well;Patient limited by endurance  PT Equipment Recommendations  Equipment Needed: No       Discharge Recommendations:  Subacute/Skilled Nursing Facility    G-Code     OutComes Score                                                      Goals  Short term goals  Time Frame for Short term goals: 14 visits  Short term goal 1: Patient will be IND with bed mobility  Short term goal 2: Patient will be IND with transfers to promote safe access to bathroom  Short term goal 3: Patient will amb IND with appropriate device on level surfaces without LOB  Short term goal 4: Patient will

## 2017-09-29 NOTE — PLAN OF CARE
Problem: Falls - Risk of  Goal: Absence of falls  Outcome: Ongoing  Pt able to pivot to [facility] with 1xassist - pt weak. Fall precautions in place. Bed in low locked position. Call light in reach. Bed alarm set. Problem: Risk for Impaired Skin Integrity  Goal: Tissue integrity - skin and mucous membranes  Structural intactness and normal physiological function of skin and  mucous membranes. Outcome: Ongoing  Small tear on left buttock seen - surrounded by reddened skin. Covered w/ mepilex. See skin assessment for further details. Pt able to move self on bed with minimal limitations. Assisted w/ repositioning as needed. Pillow support provided.

## 2017-09-29 NOTE — PROGRESS NOTES
Cardiovascular progress Note  - Coverage for SPECIALTY [facility]    Patient name: Sunny Damian    YOB: 1940  Date of admission:  9/25/2017       Patient seen, examined. Previous clinical entries reviewed. All available laboratory, imaging and ancillary data reviewed. Subjective:      Alexandria Lindquist is a 67 y. o. female, who follows with [facility] for her cardiac care, with past medical history significant for non-ischemic cardiomyopathy with normalized LVEF, chronic diastolic heart failure, and chronic atrial fibrillation. She takes warfarin for her high [id number] score, she has not tolerated NOAC's in the past. She presented to [address] [place] [address] ER 9/25/17 with complaints of epistaxis. Cardiology is following.      Hemodynamically stable. Atrial fibrillation with controlled ventricular response. Labs with improved Hg post transfusion. She denies chest pain, palpitations, or worsening dyspnea. She has chronic lymphedema.      Cardiac Data:  Coronary arteries:Normal coronaries per cath 6/2010  Structural Heart: Mild LVH, Mild aortic stenosis. Electrical Abnormalities: pSVT, New onset atrial fibrillation  Heart Failure: Chronic Diastolic Stage: C FC: III  Ventricular function: Hx of NICMP with Normalized LVEF, LVEF 27%, Grade I Diastolic Dysfunction    Systems review:  Constitutional: No fever/chills. HENT: No headache, neck pain or neck stiffness. No sore throat or dysphagia. Gastrointestinal: No abdominal pain, nausea or vomiting.    Cardiac: As Above  Respiratory: As above  Neurologic: No new focal weakness or numbness  Psychiatric: + Anxiety/Depression     Examination:   Vitals: /65  Pulse 72  Temp 97.7 °F (36.5 °C) (Infrared)   Resp 19  Ht 5' 4\" (1.626 m)  Wt 250 lb (113.4 kg)  LMP  (LMP Unknown)  SpO2 94%  BMI 42.91 kg/m2    Intake/Output Summary (Last 24 hours) at 09/29/17 0901  Last data filed at 09/28/17 1920   Gross per 24 hour   Intake          1412.39 ml   Output                0 ml   Net (ROCEPHIN) IV  1 g Intravenous Q24H    allopurinol  100 mg Oral Daily    atorvastatin  40 mg Oral Daily    diltiazem  120 mg Oral Daily    metoprolol succinate  50 mg Oral Daily    venlafaxine  225 mg Oral Daily    insulin lispro  0-3 Units Subcutaneous Nightly    insulin lispro  0-6 Units Subcutaneous TID WC     Continuous Infusions:   sodium chloride 50 mL/hr at 09/28/17 2023    dextrose       Assessment/ Plan :     Epistaxis    - ENT following      Blood loss anemia    - Hg stable post transfusion      Atrial Fibrillation on warfarin therapy    - continue beta blocker, CCB for rate control      Chronic Diastolic Heart Failure       ADRIANE     - Renal function improved.      Hypertension- Stable      Hyperlipidemia- Statin      Diabetes Mellitus      Morbid Obesity with CHARITO    COntinue current management. Thank you very much for allowing us to participate in the care of this patient. Please call us with any questions.       Electronically signed by Chica Merritt MD on 9/29/2017 at 9:01 AM

## 2017-09-29 NOTE — PROGRESS NOTES
Follow-up diabetes  No polyuria polydipsia no hypoglycemia which was reviewed control  Review of system no fever no chills no GI/ complaints except constipation no cardiopulmonary complaints no TIA no bleeding mildly unsteady gait  Physical exam vitals revealed stable  Head normocephalic/atraumatic. Eyes mild pallor no jaundice  Heart regular irregular no gallop, lungs air entry equal clear to auscultation percussion  Abdomen soft plus bowel sounds without any tenderness without any megaly  Extremities good pulses without edema negative Homans sign  Neuro alert and oriented ×3 no focal deficit  Assessment and plan  Left epistaxis resolved packing removed  Acute blood loss anemia stable no need for transfusion today  Atrial fibrillation rate control restart Coumadin  Constipation on Colace and Senokot add lactulose  Type 2 diabetes controlled same treatment continue insulin coverage  Unsteady gait continue PT OT may need rehab.

## 2017-09-29 NOTE — CARE COORDINATION
Case Management Initial Discharge Plan  Lissette Rutherford,         Readmission Risk              Readmission Risk:        13.5       Age 72 or Greater:  1    Admitted from SNF or Requires Paid or Family Care:  0    Currently has CHF,COPD,ARF,CRI,or is on dialysis:  4    Takes more than 5 Prescription Medications:  4    Takes Digoxin,Insulin,Anticoagulants,Narcotics or ASA/Plavix:  2    [address] in Past 12 Months:  0    On Disability:  0    Patient Considers own Health:  2.5            Met with:patient and dtr to discuss discharge plans. Information verified: address, contacts, phone number, , insurance Yes  PCP: Bel Boone MD  Date of last visit:  2 weeks    Insurance Provider:  Medicare and [employer]    Discharge Planning  Current Residence:     Living Arrangements:  [place] has bi level stories/5 stairs to climb  Support Systems:  None  Current Services PTA:    Supplier:  [employer]  Patient able to perform ADL's:Assisted  DME used to aid ambulation prior to admission:  Walker,  cane, CPAP, Home 02/during admission walker    Potential Assistance Needed:  N/A    Pharmacy:     Potential Assistance Purchasing Medications:  No  Does patient want to participate in local refill/ meds to beds program?  No    Patient agreeable to home care: Yes  Freedom of choice provided:  yes      Type of Home Care Services:  None  Patient expects to be discharged to:  home    Prior SNF/Rehab Placement and Facility:  [address] [place] [address] Avenue to SNF/Rehab: No  [place] of choice provided: n/a   Evaluation: yes    Expected Discharge date:  17  Follow Up Appointment: Best Day/ Time:  (any time is good, not early am)    Transportation provider:  dtr   Transportation arrangements needed for discharge: No    Discharge Plan:   Met with dtr Jennifer at bedside. Pt lives with her son and Unique Wallace and another sister assist with medications and transportation. Pt uses walker at home.    Pt has home 02 thru [employer] and CPAP. Discussed PT recommendation for SNF. Pt is refusing SNF. Agreeable to referral to [facility]. Referral e faxed. [facility] initiated.        Electronically signed by Olena Colon RN on 9/29/17 at 4:55 PM

## 2017-09-30 PROBLEM — M79.89 LEFT LEG SWELLING: Chronic | Status: ACTIVE | Noted: 2017-09-30

## 2017-09-30 LAB
ABSOLUTE EOS #: 0.3 K/UL (ref 0–0.4)
ABSOLUTE LYMPH #: 1.9 K/UL (ref 1–4.8)
ABSOLUTE MONO #: 0.6 K/UL (ref 0.2–0.8)
BASOPHILS # BLD: 0 %
BASOPHILS ABSOLUTE: 0 K/UL (ref 0–0.2)
DIFFERENTIAL TYPE: ABNORMAL
EOSINOPHILS RELATIVE PERCENT: 3 %
FIO2: 21
GLUCOSE BLD-MCNC: 184 MG/DL (ref 65–105)
GLUCOSE BLD-MCNC: 199 MG/DL (ref 65–105)
GLUCOSE BLD-MCNC: 204 MG/DL (ref 65–105)
HCT VFR BLD CALC: 25.7 % (ref 36–46)
HCT VFR BLD CALC: 27.4 % (ref 36–46)
HEMOGLOBIN: 8.1 G/DL (ref 12–16)
HEMOGLOBIN: 8.6 G/DL (ref 12–16)
INR BLD: 1.1
LYMPHOCYTES # BLD: 23 %
MCH RBC QN AUTO: 26 PG (ref 26–34)
MCHC RBC AUTO-ENTMCNC: 31.5 G/DL (ref 31–37)
MCV RBC AUTO: 82.4 FL (ref 80–100)
MONOCYTES # BLD: 7 %
NEGATIVE BASE EXCESS, ART: 1 (ref 0–2)
O2 DEVICE/FLOW/%: ABNORMAL
PARTIAL THROMBOPLASTIN TIME: 25.4 SEC (ref 23–31)
PARTIAL THROMBOPLASTIN TIME: 26.4 SEC (ref 23–31)
PATIENT TEMP: 97.2
PDW BLD-RTO: 19 % (ref 11.5–14.5)
PLATELET # BLD: 205 K/UL (ref 130–400)
PLATELET ESTIMATE: ABNORMAL
PMV BLD AUTO: 6.9 FL (ref 6–12)
POC HCO3: 23.6 MMOL/L (ref 22–27)
POC O2 SATURATION: 90 %
POC PCO2 TEMP: ABNORMAL MM HG
POC PCO2: 36 MM HG (ref 32–45)
POC PH TEMP: ABNORMAL
POC PH: 7.42 (ref 7.35–7.45)
POC PO2 TEMP: ABNORMAL MM HG
POC PO2: 56 MM HG (ref 75–95)
POSITIVE BASE EXCESS, ART: ABNORMAL (ref 0–2)
PROTHROMBIN TIME: 11.4 SEC (ref 9.7–11.6)
RBC # BLD: 3.12 M/UL (ref 4–5.2)
RBC # BLD: ABNORMAL 10*6/UL
SEG NEUTROPHILS: 67 %
SEGMENTED NEUTROPHILS ABSOLUTE COUNT: 5.3 K/UL (ref 1.8–7.7)
TCO2 (CALC), ART: 25 MMOL/L (ref 23–28)
WBC # BLD: 8.1 K/UL (ref 3.5–11)
WBC # BLD: ABNORMAL 10*3/UL

## 2017-09-30 PROCEDURE — 85610 PROTHROMBIN TIME: CPT

## 2017-09-30 PROCEDURE — 36415 COLL VENOUS BLD VENIPUNCTURE: CPT

## 2017-09-30 PROCEDURE — 36600 WITHDRAWAL OF ARTERIAL BLOOD: CPT

## 2017-09-30 PROCEDURE — 6360000002 HC RX W HCPCS: Performed by: INTERNAL MEDICINE

## 2017-09-30 PROCEDURE — 94762 N-INVAS EAR/PLS OXIMTRY CONT: CPT

## 2017-09-30 PROCEDURE — 2700000000 HC OXYGEN THERAPY PER DAY

## 2017-09-30 PROCEDURE — 82947 ASSAY GLUCOSE BLOOD QUANT: CPT

## 2017-09-30 PROCEDURE — 93970 EXTREMITY STUDY: CPT

## 2017-09-30 PROCEDURE — 82803 BLOOD GASES ANY COMBINATION: CPT

## 2017-09-30 PROCEDURE — 6370000000 HC RX 637 (ALT 250 FOR IP): Performed by: OTOLARYNGOLOGY

## 2017-09-30 PROCEDURE — 94640 AIRWAY INHALATION TREATMENT: CPT

## 2017-09-30 PROCEDURE — 2060000000 HC ICU INTERMEDIATE R&B

## 2017-09-30 PROCEDURE — 6370000000 HC RX 637 (ALT 250 FOR IP): Performed by: INTERNAL MEDICINE

## 2017-09-30 PROCEDURE — 85018 HEMOGLOBIN: CPT

## 2017-09-30 PROCEDURE — 85025 COMPLETE CBC W/AUTO DIFF WBC: CPT

## 2017-09-30 PROCEDURE — 2580000003 HC RX 258: Performed by: INTERNAL MEDICINE

## 2017-09-30 PROCEDURE — 85730 THROMBOPLASTIN TIME PARTIAL: CPT

## 2017-09-30 PROCEDURE — 85014 HEMATOCRIT: CPT

## 2017-09-30 RX ORDER — HEPARIN SODIUM 1000 [USP'U]/ML
4000 INJECTION, SOLUTION INTRAVENOUS; SUBCUTANEOUS ONCE
Status: DISCONTINUED | OUTPATIENT
Start: 2017-09-30 | End: 2017-09-30

## 2017-09-30 RX ORDER — CEPHALEXIN 500 MG/1
500 CAPSULE ORAL 2 TIMES DAILY
Status: DISCONTINUED | OUTPATIENT
Start: 2017-09-30 | End: 2017-10-02 | Stop reason: HOSPADM

## 2017-09-30 RX ORDER — CLOTRIMAZOLE 1 %
CREAM (GRAM) TOPICAL 2 TIMES DAILY
Status: DISCONTINUED | OUTPATIENT
Start: 2017-09-30 | End: 2017-10-02 | Stop reason: HOSPADM

## 2017-09-30 RX ORDER — HEPARIN SODIUM 5000 [USP'U]/ML
5000 INJECTION, SOLUTION INTRAVENOUS; SUBCUTANEOUS EVERY 8 HOURS SCHEDULED
Status: DISCONTINUED | OUTPATIENT
Start: 2017-09-30 | End: 2017-10-02 | Stop reason: HOSPADM

## 2017-09-30 RX ORDER — HEPARIN SODIUM 1000 [USP'U]/ML
4000 INJECTION, SOLUTION INTRAVENOUS; SUBCUTANEOUS PRN
Status: DISCONTINUED | OUTPATIENT
Start: 2017-09-30 | End: 2017-09-30

## 2017-09-30 RX ORDER — HEPARIN SODIUM 1000 [USP'U]/ML
2000 INJECTION, SOLUTION INTRAVENOUS; SUBCUTANEOUS PRN
Status: DISCONTINUED | OUTPATIENT
Start: 2017-09-30 | End: 2017-09-30

## 2017-09-30 RX ORDER — HEPARIN SODIUM 10000 [USP'U]/100ML
1000 INJECTION, SOLUTION INTRAVENOUS CONTINUOUS
Status: DISCONTINUED | OUTPATIENT
Start: 2017-09-30 | End: 2017-09-30

## 2017-09-30 RX ADMIN — DOCUSATE SODIUM 100 MG: 100 CAPSULE, LIQUID FILLED ORAL at 20:16

## 2017-09-30 RX ADMIN — IPRATROPIUM BROMIDE AND ALBUTEROL SULFATE 1 AMPULE: .5; 3 SOLUTION RESPIRATORY (INHALATION) at 14:49

## 2017-09-30 RX ADMIN — ACETAMINOPHEN 650 MG: 325 TABLET ORAL at 21:05

## 2017-09-30 RX ADMIN — INSULIN GLARGINE 16 UNITS: 100 INJECTION, SOLUTION SUBCUTANEOUS at 22:08

## 2017-09-30 RX ADMIN — INSULIN LISPRO 1 UNITS: 100 INJECTION, SOLUTION INTRAVENOUS; SUBCUTANEOUS at 22:09

## 2017-09-30 RX ADMIN — SALINE NASAL SPRAY 1 SPRAY: 1.5 SOLUTION NASAL at 20:18

## 2017-09-30 RX ADMIN — CEFTRIAXONE SODIUM 1 G: 1 INJECTION, POWDER, FOR SOLUTION INTRAMUSCULAR; INTRAVENOUS at 13:48

## 2017-09-30 RX ADMIN — IPRATROPIUM BROMIDE AND ALBUTEROL SULFATE 1 AMPULE: .5; 3 SOLUTION RESPIRATORY (INHALATION) at 07:22

## 2017-09-30 RX ADMIN — DILTIAZEM HYDROCHLORIDE 120 MG: 120 CAPSULE, COATED, EXTENDED RELEASE ORAL at 10:33

## 2017-09-30 RX ADMIN — SENNOSIDES 17.2 MG: 8.6 TABLET, FILM COATED ORAL at 20:18

## 2017-09-30 RX ADMIN — LACTULOSE 20 G: 20 SOLUTION ORAL at 20:17

## 2017-09-30 RX ADMIN — LACTULOSE 20 G: 20 SOLUTION ORAL at 10:33

## 2017-09-30 RX ADMIN — Medication 10 ML: at 20:19

## 2017-09-30 RX ADMIN — OXYMETAZOLINE HYDROCHLORIDE 2 SPRAY: 0.05 SPRAY NASAL at 20:18

## 2017-09-30 RX ADMIN — IPRATROPIUM BROMIDE AND ALBUTEROL SULFATE 1 AMPULE: .5; 3 SOLUTION RESPIRATORY (INHALATION) at 11:42

## 2017-09-30 RX ADMIN — HEPARIN SODIUM 5000 UNITS: 5000 INJECTION, SOLUTION INTRAVENOUS; SUBCUTANEOUS at 20:20

## 2017-09-30 RX ADMIN — METOPROLOL SUCCINATE 50 MG: 50 TABLET, FILM COATED, EXTENDED RELEASE ORAL at 10:35

## 2017-09-30 RX ADMIN — Medication 10 ML: at 13:49

## 2017-09-30 RX ADMIN — DOCUSATE SODIUM 100 MG: 100 CAPSULE, LIQUID FILLED ORAL at 10:33

## 2017-09-30 RX ADMIN — OXYMETAZOLINE HYDROCHLORIDE 2 SPRAY: 0.05 SPRAY NASAL at 10:34

## 2017-09-30 RX ADMIN — ATORVASTATIN CALCIUM 40 MG: 40 TABLET, FILM COATED ORAL at 10:33

## 2017-09-30 RX ADMIN — Medication: at 10:42

## 2017-09-30 RX ADMIN — MICONAZOLE NITRATE: 1.42 POWDER TOPICAL at 20:17

## 2017-09-30 RX ADMIN — INSULIN LISPRO 1 UNITS: 100 INJECTION, SOLUTION INTRAVENOUS; SUBCUTANEOUS at 13:49

## 2017-09-30 RX ADMIN — INSULIN LISPRO 2 UNITS: 100 INJECTION, SOLUTION INTRAVENOUS; SUBCUTANEOUS at 18:04

## 2017-09-30 RX ADMIN — Medication: at 20:18

## 2017-09-30 RX ADMIN — CLOTRIMAZOLE: 10 CREAM TOPICAL at 10:33

## 2017-09-30 RX ADMIN — CEPHALEXIN 500 MG: 500 CAPSULE ORAL at 20:16

## 2017-09-30 RX ADMIN — CLOTRIMAZOLE: 10 CREAM TOPICAL at 20:15

## 2017-09-30 RX ADMIN — WARFARIN SODIUM 5 MG: 5 TABLET ORAL at 18:04

## 2017-09-30 RX ADMIN — IPRATROPIUM BROMIDE AND ALBUTEROL SULFATE 1 AMPULE: .5; 3 SOLUTION RESPIRATORY (INHALATION) at 23:40

## 2017-09-30 RX ADMIN — IPRATROPIUM BROMIDE AND ALBUTEROL SULFATE 1 AMPULE: .5; 3 SOLUTION RESPIRATORY (INHALATION) at 03:47

## 2017-09-30 RX ADMIN — VENLAFAXINE HYDROCHLORIDE 225 MG: 75 CAPSULE, EXTENDED RELEASE ORAL at 10:34

## 2017-09-30 RX ADMIN — ALLOPURINOL 100 MG: 100 TABLET ORAL at 10:33

## 2017-09-30 ASSESSMENT — PAIN SCALES - GENERAL
PAINLEVEL_OUTOF10: 0
PAINLEVEL_OUTOF10: 9
PAINLEVEL_OUTOF10: 0

## 2017-09-30 NOTE — CONSULTS
Arnulfo Verde      Name: Dileep Hernandez  MRN: [id number]     Acct: [de-identified]  Room: [address] [place] [address]    Admit Date: 9/25/2017  PCP: Alex Mustafa MD    Physician Requesting Consult:  Dr. Dayna Arriaga MD    Reason for Consult:  LLE Edema    Chief Complaint:     Chief Complaint   Patient presents with    Epistaxis       History Obtained From:     patient    History of Present Illness:      Dileep Hernandez is a  67 y.o.  female who presents with Epistaxis        She is anticoagulated for atrial fibrillation and had a nose bleed for 3 days. Incidentally, she was found to have significant left leg swelling with a positive history of DVT in her right leg. She states the DVT was so long ago that she does not remember when it was. Her left leg has been swollen \"over 48 years\" in [address] [place] [address] after the birth of her daughter. It has not acutely changed since then. She denies chest pain or shortness of breath, no history of stroke or TIA, no history of MI, no history of AAA, no claudication. Past Medical History:     Past Medical History:   Diagnosis Date    Anxiety     Cancer ([facility] [address] [facility]     cervical    CHF (congestive heart failure) (HCC)     COPD (chronic obstructive pulmonary disease) (HCC)     Depression (emotion)     [facility]     Hyperlipidemia     Hypertension     Tachycardia     Vertigo         Past Surgical History:     Past Surgical History:   Procedure Laterality Date    HYSTERECTOMY          Medications Prior to Admission:       Prior to Admission medications    Medication Sig Start Date End Date Taking?  Authorizing Provider   spironolactone (ALDACTONE) 25 MG tablet Take 25 mg by mouth daily   Yes Historical Provider, MD   warfarin (COUMADIN) 7.5 MG tablet Take 7.5 mg by mouth daily Indications: with 0.5mg total 8mg   Yes Historical Provider, MD   warfarin (COUMADIN) 1 MG tablet Take 0.5 mg by mouth daily Indications: with 7.5mg . total 8mg daily   Yes Historical file.  Alcohol:      reports that she does not drink alcohol. Drug Use:  reports that she does not use illicit drugs.     Family History:     Family History   Problem Relation Age of Onset    Other Mother     Heart Disease Father        Review of Systems:     Positive and Negative as described in HPI    Constitutional:  negative for  fevers, chills, sweats, fatigue, and weight loss  HEENT:  negative for vision or hearing changes,   Respiratory:  negative for shortness of breath, cough, or congestion  Cardiovascular:  negative for  chest pain, palpitations  Gastrointestinal:  negative for nausea, vomiting, diarrhea, constipation, abdominal pain  Genitourinary:  negative for frequency, dysuria  Integument/Breast:  negative for rash, skin lesions  Musculoskeletal:  negative for muscle aches or joint pain  Neurological:  negative for headaches, dizziness, lightheadedness, numbness, pain and tingling extremities  Behavior/Psych:  negative for depression and anxiety    Code Status:  Full Code    Physical Exam:     Vitals:  /69  Pulse 59  Temp 97.7 °F (36.5 °C) (Infrared)   Resp 21  Ht 5' 4\" (1.626 m)  Wt 250 lb (113.4 kg)  LMP  (LMP Unknown)  SpO2 99%  BMI 42.91 kg/m2  Temp (24hrs), Av.2 °F (36.8 °C), Min:97.7 °F (36.5 °C), Max:98.8 °F (37.1 °C)      General appearance - alert, well appearing and in no acute distress  Mental status - oriented to person, place and time with normal affect  Head - normocephalic and atraumatic  Eyes - pupils equal and reactive, extraocular eye movements intact, conjunctiva clear  Ears - hearing appears to be intact  Nose - no drainage noted  Mouth - mucous membranes moist  Neck - supple, no carotid bruits, thyroid not palpable, no JVD  Chest - clear to auscultation, normal effort  Heart - normal rate, regular rhythm, no murmurs  Abdomen - soft, non-tender, non-distended, bowel sounds present all four quadrants, no masses, hepatomegaly, splenomegaly or aortic by Vignesh Gayle MD on 9/30/2017 at 8:19 AM     Copy sent to Dr. America Dang MD

## 2017-09-30 NOTE — PROGRESS NOTES
0300- attempted to get pt to let staff to start a new IV. Educated on heparin gtt. Pt continues to refuse. 6317 - Pt's bed alarm went off at and pt seen climbing out of bed headed to bathroom. Pt became agitated and aggressive towards nursing staff when staff tired to stop pt from walking unassisted. pt insisted on going to bathroom. Pt made it to bathroom with minimal assist. Gait slightly unsteady. Pt voided. No BM.   0340- pt used walker and 1x assist to get back to bed. Pt oriented x4. Educated pt on importance of using call light. Bed alarm set. 0400- attempted to talk pt of importance of IV access again and need for heparin gtt. Pt understands but is refusing at this time. Asked pt about code status and pt said that \"if it was her time, it's her time\" and wants comfort. States she has a living will and that all of her children know her medical wishes. Told pt that we will follow up with children later today.

## 2017-09-30 NOTE — PLAN OF CARE
Problem: Falls - Risk of  Goal: Absence of falls  Outcome: Ongoing  No falls occurred during shift. Pt got out of bed without letting staff know- bed alarm sounded. Fall precautions remain in place. Bed in low locked position. Call light in reach. Bed alarm set. Problem: Risk for Impaired Skin Integrity  Goal: Tissue integrity - skin and mucous membranes  Structural intactness and normal physiological function of skin and  mucous membranes. Outcome: Ongoing  Skin assessment performed - see assessment for further details. No new signs of skin breakdown. Patient repositions self. Assisted w/ repositioning as needed.  Pillow support provided

## 2017-09-30 NOTE — PROGRESS NOTES
Cardiovascular progress Note  - Coverage for [facility]    Patient name: Caleb Gong    YOB: 1940  Date of admission:  9/25/2017       Patient seen, examined. Previous clinical entries reviewed. All available laboratory, imaging and ancillary data reviewed. Subjective:      Marylen Quay Pratt is a 67 y. o. female, who follows with [facility] for her cardiac care, with past medical history significant for non-ischemic cardiomyopathy with normalized LVEF, chronic diastolic heart failure, and chronic atrial fibrillation. She takes warfarin for her high [id number] score, she has not tolerated NOAC's in the past. She presented to [address] [place] [address] ER 9/25/17 with complaints of epistaxis. Cardiology is following.      She had mild increase in shortness of breath last night. Her diuretics were given. This morning she appears to be more stable. She denies chest pain, palpitations, or worsening dyspnea. She has chronic lymphedema.      Cardiac Data:  Coronary arteries:Normal coronaries per cath 6/2010  Structural Heart: Mild LVH, Mild aortic stenosis. Electrical Abnormalities: pSVT, New onset atrial fibrillation  Heart Failure: Chronic Diastolic Stage: C FC: III  Ventricular function: Hx of NICMP with Normalized LVEF, LVEF 27%, Grade I Diastolic Dysfunction    Systems review:  Constitutional: No fever/chills. HENT: No headache, neck pain or neck stiffness. No sore throat or dysphagia. Gastrointestinal: No abdominal pain, nausea or vomiting.    Cardiac: As Above  Respiratory: As above  Neurologic: No new focal weakness or numbness  Psychiatric: + Anxiety/Depression     Examination:   Vitals: /69  Pulse 59  Temp 97.7 °F (36.5 °C) (Infrared)   Resp 21  Ht 5' 4\" (1.626 m)  Wt 250 lb (113.4 kg)  LMP  (LMP Unknown)  SpO2 99%  BMI 42.91 kg/m2    Intake/Output Summary (Last 24 hours) at 09/30/17 0906  Last data filed at 09/30/17 0025   Gross per 24 hour   Intake          1276.05 ml   Output             1150 ml Inhalation Q4H    senna  2 tablet Oral Nightly    docusate sodium  100 mg Oral BID    insulin glargine  16 Units Subcutaneous Nightly    sodium chloride  250 mL Intravenous Once    tranexamic acid  500 mg Topical Once    sterile water  5 mL Injection Once    sodium chloride flush  10 mL Intravenous 2 times per day    cefTRIAXone (ROCEPHIN) IV  1 g Intravenous Q24H    allopurinol  100 mg Oral Daily    atorvastatin  40 mg Oral Daily    diltiazem  120 mg Oral Daily    metoprolol succinate  50 mg Oral Daily    venlafaxine  225 mg Oral Daily    insulin lispro  0-3 Units Subcutaneous Nightly    insulin lispro  0-6 Units Subcutaneous TID WC     Continuous Infusions:   heparin (porcine)      dextrose       Assessment/ Plan :     Epistaxis    - ENT following      Blood loss anemia    - Hg stable post transfusion      Atrial Fibrillation on warfarin therapy    - continue beta blocker, CCB for rate control      Chronic Diastolic Heart Failure     -with mild exacerbation, clinically controlled.        Hypertension- Stable      Hyperlipidemia- Statin      Diabetes Mellitus      Morbid Obesity with CHARITO        Agree with current management. Will follow patient with you. Continue warfarin for now. Thank you very much for allowing us to participate in the care of this patient. Please call us with any questions.       Electronically signed by Richard Hagan MD on 9/30/2017 at 9:06 AM

## 2017-09-30 NOTE — PROGRESS NOTES
Follow-up diabetes  Polyuria polydipsia no hypoglycemia blood sugars reviewed  Review of system no fever no chills shortness of breath with exertion no chest pain no palpitation, no  GI complaint, no polyuria nor polydipsia no hypoglycemia no TIA no bleeding no headaches  Exam vitals stable  Eyes mild pallor no jaundice, head normocephalic atraumatic  Lungs air entry equal decreased at the bases.   Rhonchi and expiratory wheezing no use of intercostal  Heart ieregular controlled  Abdomen obese positive bowel sounds without any tenderness without any management  Extremities good pulses negative Homans sign chronic lymphedema  Neuro alert oriented ×3 with no focal deficits  Assessment and plan  Left epistaxis resolved  Acute blood loss anemia resolved  vinnie on ckd3  Better  Type 2 diabetes with hyperglycemia and renal insufficiency  Morbid obesity  Chronic respiratory failure with hypoxia  CHARITO on CPAP  Anterior fibrillation  Meds reviewed refused IV heparin will continue with Coumadin 5 mg Coumadin today ambulate aerosol protocol repeat PT/INR in the morning physical therapy occupational therapy see orders

## 2017-09-30 NOTE — CONSULTS
Inpatient consult to Pulmonology  Consult performed by: Briseida Casas ordered by: Viviana Rodriguez  Assessment/Recommendations: PULMONARY, Andressa Hinojosa MD    REASON FOR CONSULTATION/CC: HYPOXEMIA AND COPD AEX      CONSULTING PHYSICIAN:  [facility]  PCP: Lilliam Garrison MD    HISTORY OF PRESENT ILLNESS: Sandra Marr is a 67y.o. year old female [address] [employer], WITH INCREASED WORK OF BREATHING. KNOWN CHARITO, NONCOMPLIANT AND HAS NEBULIZER , DOESNOT USE HER NEBULIZER, AS PER PT AND DAUGHTER. DYSPNEA ON ANY EXERTION. TACHYCARDIA FROM A FIB. PAST MEDICAL HISTORY:  Past Medical History:  No date: Anxiety  No date: Cancer [facility])      Comment: cervical  No date: CHF (congestive heart failure) ([facility])  No date: COPD (chronic obstructive pulmonary disease) (*  No date: Depression (emotion)  No date: Diabetes mellitus (HCC)  No date: Hyperlipidemia  No date: Hypertension  No date: Tachycardia  No date: Vertigo    PAST SURGICAL HISTORY:  Past Surgical History:  No date: HYSTERECTOMY    FAMILY HISTORY:  family history includes Heart Disease in her father; Other in her mother. SOCIAL HISTORY:   reports that she quit smoking about 47 years ago. Her smoking use included Cigarettes. She does not have any smokeless tobacco history on file.     OCCUPATIONAL HISTORY; denies any exposure to chemicals, coal dust, or asbestos  TRAVEL HISTORY: no recent travel history, no close contact with infectious disease      Scheduled Meds:  heparin (porcine), 4,000 Units, Intravenous, Once  clotrimazole, , Topical, BID  miconazole, , Topical, BID  saline nasal gel, , Nasal, BID  oxymetazoline, 2 spray, Each Nare, BID  lactulose, 20 g, Oral, BID  warfarin, 5 mg, Oral, Daily  warfarin (COUMADIN) daily dosing (placeholder), , Other, RX Placeholder  ipratropium-albuterol, 1 ampule, Inhalation, Q4H  senna, 2 tablet, Oral, Nightly  docusate sodium, 100 mg, Oral, BID  insulin glargine, 16 Units, Subcutaneous, Nightly  sodium chloride, 250 mL, Intravenous, Once  tranexamic acid, 500 mg, Topical, Once  sterile water, 5 mL, Injection, Once  sodium chloride flush, 10 mL, Intravenous, 2 times per day  cefTRIAXone (ROCEPHIN) IV, 1 g, Intravenous, Q24H  allopurinol, 100 mg, Oral, Daily  atorvastatin, 40 mg, Oral, Daily  diltiazem, 120 mg, Oral, Daily  metoprolol succinate, 50 mg, Oral, Daily  venlafaxine, 225 mg, Oral, Daily  insulin lispro, 0-3 Units, Subcutaneous, Nightly  insulin lispro, 0-6 Units, Subcutaneous, TID WC        Continuous Infusions:  heparin (porcine)  dextrose        PRN Meds:  heparin (porcine), heparin (porcine), sodium chloride, sodium chloride flush, acetaminophen, ondansetron, levalbuterol, nitroGLYCERIN, glucose, dextrose, glucagon (rDNA), dextrose    ALLERGIES:  Patient is allergic to lisinopril. REVIEW OF SYSTEMS:  UNABLE TO GIVE, DROWSY, HAS MASK ON   DYSPNEA ON EXERTION      Objective:  PHYSICAL EXAM:  Blood pressure 103/69, pulse 59, temperature 97.7 °F (36.5 °C), temperature source Infrared, resp. rate 16, height 5' 4\" (1.626 m), weight 250 lb (113.4 kg), SpO2 97 %, not currently breastfeeding.'  Gen: SLEEPY AND DROWSY  Eyes: PERRL. .   ENT: No discharge. Pharynx clear. CROWDED OP, LARGE TONGUE. Neck: Trachea midline. No obvious mass. Neck is supple. WIDE  Resp: POOR INSP EFFORT, WITH COARSE CRACKLES AT BASES  CV: iR-Regular rate. NO S3  GI: Non-tender. Non-distended. No hernia. Skin: Warm, dry, normal texture and turgor. No nodule on exposed extremities. Lymph: No cervical LAD. No supraclavicular LAD. M/S: No cyanosis. No clubbing. No joint deformity. 2+ leg edema  Neuro:  Moves all extremities, no focal defect      Data Reviewed:  LABS:  CBC:                 09/28/17      --          09/29/17 09/29/17 09/29/17 09/30/17                       0439           --           0529          0950          1814          0509          WBC          9.6           -- Medical/Surgical History: diabetes   COPD   CHF   tachycardia   HTN    FINDINGS:  Cardiomegaly.  No focal consolidation.  No significant pleural effusion.  No  pneumothorax.  Improved pulmonary venous congestion.      Impression:      1. Improved pulmonary venous congestion.  Unchanged cardiomegaly.       Assessment:    COPD AEX  CHRONIC RES FAILURE, HYPOXEMIA  OBSTRUCTIVE SLEEP APNEA, NONCOMPLIANT  MORBID OBESITY       Plan:   CHECK RA ABG THIS MORNING  NCPAP EDUCATION, DISCUSSED WITH THE DAUGHTER  EDUCATION ON USE OF NEBULIZER  OUT OF BED,RANJANA Dee [address], MD  PULMONARY, CRITICAL CARE  [facility] PULMONARY  SLEEP [facility] CRITICAL [facility]ARE [facility]  [phone]

## 2017-09-30 NOTE — PLAN OF CARE
Problem: Falls - Risk of  Goal: Absence of falls  Outcome: Ongoing  Uses call light appropriately, fall precautions in place will continue to monitor. Problem: Risk for Impaired Skin Integrity  Goal: Tissue integrity - skin and mucous membranes  Structural intactness and normal physiological function of skin and  mucous membranes. Outcome: Ongoing  Continue to monitor skin integrity and IV sites    Problem: Pain:  Goal: Pain level will decrease  Pain level will decrease   Outcome: Ongoing  Patient will have progressive improvement with pain scale with interventions.

## 2017-10-01 PROBLEM — I48.20 CHRONIC ATRIAL FIBRILLATION (HCC): Status: ACTIVE | Noted: 2017-10-01

## 2017-10-01 LAB
ABSOLUTE EOS #: 0.2 K/UL (ref 0–0.4)
ABSOLUTE LYMPH #: 1.6 K/UL (ref 1–4.8)
ABSOLUTE MONO #: 0.5 K/UL (ref 0.2–0.8)
ANION GAP SERPL CALCULATED.3IONS-SCNC: 13 MMOL/L (ref 9–17)
BASOPHILS # BLD: 1 %
BASOPHILS ABSOLUTE: 0.1 K/UL (ref 0–0.2)
BUN BLDV-MCNC: 35 MG/DL (ref 8–23)
BUN/CREAT BLD: 30 (ref 9–20)
CALCIUM SERPL-MCNC: 8.6 MG/DL (ref 8.6–10.4)
CHLORIDE BLD-SCNC: 97 MMOL/L (ref 98–107)
CO2: 24 MMOL/L (ref 20–31)
CREAT SERPL-MCNC: 1.15 MG/DL (ref 0.5–0.9)
DIFFERENTIAL TYPE: ABNORMAL
EOSINOPHILS RELATIVE PERCENT: 3 %
GFR AFRICAN AMERICAN: 55 ML/MIN
GFR NON-AFRICAN AMERICAN: 46 ML/MIN
GFR SERPL CREATININE-BSD FRML MDRD: ABNORMAL ML/MIN/{1.73_M2}
GFR SERPL CREATININE-BSD FRML MDRD: ABNORMAL ML/MIN/{1.73_M2}
GLUCOSE BLD-MCNC: 127 MG/DL (ref 65–105)
GLUCOSE BLD-MCNC: 127 MG/DL (ref 70–99)
GLUCOSE BLD-MCNC: 168 MG/DL (ref 65–105)
GLUCOSE BLD-MCNC: 179 MG/DL (ref 65–105)
HCT VFR BLD CALC: 26 % (ref 36–46)
HCT VFR BLD CALC: 26.2 % (ref 36–46)
HEMOGLOBIN: 8.3 G/DL (ref 12–16)
HEMOGLOBIN: 8.3 G/DL (ref 12–16)
INR BLD: 1.2
LYMPHOCYTES # BLD: 22 %
MCH RBC QN AUTO: 25.8 PG (ref 26–34)
MCHC RBC AUTO-ENTMCNC: 31.9 G/DL (ref 31–37)
MCV RBC AUTO: 81 FL (ref 80–100)
MONOCYTES # BLD: 6 %
PDW BLD-RTO: 18 % (ref 11.5–14.5)
PLATELET # BLD: 223 K/UL (ref 130–400)
PLATELET ESTIMATE: ABNORMAL
PMV BLD AUTO: ABNORMAL FL (ref 6–12)
POTASSIUM SERPL-SCNC: 4.3 MMOL/L (ref 3.7–5.3)
PROTHROMBIN TIME: 12 SEC (ref 9.7–11.6)
RBC # BLD: 3.2 M/UL (ref 4–5.2)
RBC # BLD: ABNORMAL 10*6/UL
SEG NEUTROPHILS: 68 %
SEGMENTED NEUTROPHILS ABSOLUTE COUNT: 4.8 K/UL (ref 1.8–7.7)
SODIUM BLD-SCNC: 134 MMOL/L (ref 135–144)
TSH SERPL DL<=0.05 MIU/L-ACNC: 3.28 MIU/L (ref 0.3–5)
WBC # BLD: 7.2 K/UL (ref 3.5–11)
WBC # BLD: ABNORMAL 10*3/UL

## 2017-10-01 PROCEDURE — 94640 AIRWAY INHALATION TREATMENT: CPT

## 2017-10-01 PROCEDURE — 2700000000 HC OXYGEN THERAPY PER DAY

## 2017-10-01 PROCEDURE — 6360000002 HC RX W HCPCS: Performed by: INTERNAL MEDICINE

## 2017-10-01 PROCEDURE — 2060000000 HC ICU INTERMEDIATE R&B

## 2017-10-01 PROCEDURE — 85018 HEMOGLOBIN: CPT

## 2017-10-01 PROCEDURE — 2580000003 HC RX 258: Performed by: INTERNAL MEDICINE

## 2017-10-01 PROCEDURE — 85025 COMPLETE CBC W/AUTO DIFF WBC: CPT

## 2017-10-01 PROCEDURE — 36415 COLL VENOUS BLD VENIPUNCTURE: CPT

## 2017-10-01 PROCEDURE — 94761 N-INVAS EAR/PLS OXIMETRY MLT: CPT

## 2017-10-01 PROCEDURE — 6370000000 HC RX 637 (ALT 250 FOR IP): Performed by: INTERNAL MEDICINE

## 2017-10-01 PROCEDURE — 80048 BASIC METABOLIC PNL TOTAL CA: CPT

## 2017-10-01 PROCEDURE — 85014 HEMATOCRIT: CPT

## 2017-10-01 PROCEDURE — 84443 ASSAY THYROID STIM HORMONE: CPT

## 2017-10-01 PROCEDURE — 82947 ASSAY GLUCOSE BLOOD QUANT: CPT

## 2017-10-01 PROCEDURE — 85610 PROTHROMBIN TIME: CPT

## 2017-10-01 RX ADMIN — IPRATROPIUM BROMIDE AND ALBUTEROL SULFATE 1 AMPULE: .5; 3 SOLUTION RESPIRATORY (INHALATION) at 19:53

## 2017-10-01 RX ADMIN — INSULIN GLARGINE 16 UNITS: 100 INJECTION, SOLUTION SUBCUTANEOUS at 22:42

## 2017-10-01 RX ADMIN — LACTULOSE 20 G: 20 SOLUTION ORAL at 08:36

## 2017-10-01 RX ADMIN — IPRATROPIUM BROMIDE AND ALBUTEROL SULFATE 1 AMPULE: .5; 3 SOLUTION RESPIRATORY (INHALATION) at 11:29

## 2017-10-01 RX ADMIN — HEPARIN SODIUM 5000 UNITS: 5000 INJECTION, SOLUTION INTRAVENOUS; SUBCUTANEOUS at 22:42

## 2017-10-01 RX ADMIN — MICONAZOLE NITRATE: 1.42 POWDER TOPICAL at 09:00

## 2017-10-01 RX ADMIN — Medication: at 08:35

## 2017-10-01 RX ADMIN — DILTIAZEM HYDROCHLORIDE 120 MG: 120 CAPSULE, COATED, EXTENDED RELEASE ORAL at 08:35

## 2017-10-01 RX ADMIN — WARFARIN SODIUM 5 MG: 5 TABLET ORAL at 17:00

## 2017-10-01 RX ADMIN — DOCUSATE SODIUM 100 MG: 100 CAPSULE, LIQUID FILLED ORAL at 21:52

## 2017-10-01 RX ADMIN — MICONAZOLE NITRATE: 1.42 POWDER TOPICAL at 21:49

## 2017-10-01 RX ADMIN — IPRATROPIUM BROMIDE AND ALBUTEROL SULFATE 1 AMPULE: .5; 3 SOLUTION RESPIRATORY (INHALATION) at 15:11

## 2017-10-01 RX ADMIN — HEPARIN SODIUM 5000 UNITS: 5000 INJECTION, SOLUTION INTRAVENOUS; SUBCUTANEOUS at 14:24

## 2017-10-01 RX ADMIN — ACETAMINOPHEN 650 MG: 325 TABLET ORAL at 19:42

## 2017-10-01 RX ADMIN — CEPHALEXIN 500 MG: 500 CAPSULE ORAL at 21:52

## 2017-10-01 RX ADMIN — CLOTRIMAZOLE: 10 CREAM TOPICAL at 22:52

## 2017-10-01 RX ADMIN — ALLOPURINOL 100 MG: 100 TABLET ORAL at 08:35

## 2017-10-01 RX ADMIN — INSULIN LISPRO 1 UNITS: 100 INJECTION, SOLUTION INTRAVENOUS; SUBCUTANEOUS at 17:26

## 2017-10-01 RX ADMIN — CLOTRIMAZOLE: 10 CREAM TOPICAL at 09:00

## 2017-10-01 RX ADMIN — ATORVASTATIN CALCIUM 40 MG: 40 TABLET, FILM COATED ORAL at 08:35

## 2017-10-01 RX ADMIN — VENLAFAXINE HYDROCHLORIDE 225 MG: 75 CAPSULE, EXTENDED RELEASE ORAL at 08:35

## 2017-10-01 RX ADMIN — DOCUSATE SODIUM 100 MG: 100 CAPSULE, LIQUID FILLED ORAL at 08:36

## 2017-10-01 RX ADMIN — Medication 10 ML: at 08:35

## 2017-10-01 RX ADMIN — CLOTRIMAZOLE: 10 CREAM TOPICAL at 08:35

## 2017-10-01 RX ADMIN — CEPHALEXIN 500 MG: 500 CAPSULE ORAL at 08:35

## 2017-10-01 RX ADMIN — SALINE NASAL SPRAY 1 SPRAY: 1.5 SOLUTION NASAL at 14:23

## 2017-10-01 RX ADMIN — Medication 10 ML: at 21:53

## 2017-10-01 RX ADMIN — METOPROLOL SUCCINATE 50 MG: 50 TABLET, FILM COATED, EXTENDED RELEASE ORAL at 08:35

## 2017-10-01 RX ADMIN — IPRATROPIUM BROMIDE AND ALBUTEROL SULFATE 1 AMPULE: .5; 3 SOLUTION RESPIRATORY (INHALATION) at 07:48

## 2017-10-01 RX ADMIN — HEPARIN SODIUM 5000 UNITS: 5000 INJECTION, SOLUTION INTRAVENOUS; SUBCUTANEOUS at 06:12

## 2017-10-01 RX ADMIN — OXYMETAZOLINE HYDROCHLORIDE 2 SPRAY: 0.05 SPRAY NASAL at 09:00

## 2017-10-01 RX ADMIN — SALINE NASAL SPRAY 1 SPRAY: 1.5 SOLUTION NASAL at 08:35

## 2017-10-01 ASSESSMENT — PAIN SCALES - GENERAL: PAINLEVEL_OUTOF10: 3

## 2017-10-01 ASSESSMENT — PAIN DESCRIPTION - PROGRESSION: CLINICAL_PROGRESSION: NOT CHANGED

## 2017-10-01 NOTE — PLAN OF CARE
Problem: Falls - Risk of  Goal: Absence of falls  Outcome: Ongoing  Patient is a fall risk during this admission. Fall risk assessment was performed. Patient is absent of falls. Bed is in the lowest position. Wheels on the bed are locked. Call light and bed side table are within reach. Clutter is removed. Patient was educated to call out when needing assistance or wanting to get out of bed. Patient offered toileting assistance during rounding. Hourly rounds have been performed.

## 2017-10-01 NOTE — PROGRESS NOTES
vascular issues. Vascular surgery to sign off. No follow up needed.     Electronically signed by Shamika Pack MD on 10/1/2017 at 7:11 AM

## 2017-10-01 NOTE — PROGRESS NOTES
Cardiovascular progress Note  - Coverage for [facility]    Patient name: Grace Miranda    YOB: 1940  Date of admission:  9/25/2017       Patient seen, examined. Previous clinical entries reviewed. All available laboratory, imaging and ancillary data reviewed. Subjective:      Demi Lindquist is a 67 y. o. female, who follows with [facility] for her cardiac care, with past medical history significant for non-ischemic cardiomyopathy with normalized LVEF, chronic diastolic heart failure, and chronic atrial fibrillation. She takes warfarin for her high [id number] score, she has not tolerated NOAC's in the past. She presented to [address] [place] [address] ER 9/25/17 with complaints of epistaxis. She had a nasal pack and had this taken out.       Her shortness of breath is more stable. She denies chest pain, palpitations, or worsening dyspnea. She has chronic lymphedema.      Cardiac Data:  Coronary arteries:Normal coronaries per cath 6/2010  Structural Heart: Mild LVH, Mild aortic stenosis. Electrical Abnormalities: pSVT, New onset atrial fibrillation  Heart Failure: Chronic Diastolic Stage: C FC: III  Ventricular function: Hx of NICMP with Normalized LVEF, LVEF 15%, Grade I Diastolic Dysfunction    Systems review:  Constitutional: No fever/chills. HENT: No headache, neck pain or neck stiffness. No sore throat or dysphagia. Gastrointestinal: No abdominal pain, nausea or vomiting.    Cardiac: As Above  Respiratory: As above  Neurologic: No new focal weakness or numbness  Psychiatric: + Anxiety/Depression     Examination:   Vitals: /60  Pulse 69  Temp 97 °F (36.1 °C) (Infrared)   Resp 21  Ht 5' 4\" (1.626 m)  Wt 250 lb (113.4 kg)  LMP  (LMP Unknown)  SpO2 97%  BMI 42.91 kg/m2    Intake/Output Summary (Last 24 hours) at 10/01/17 1054  Last data filed at 09/30/17 1800   Gross per 24 hour   Intake             1259 ml   Output                0 ml   Net             1259 ml     General appearance: Comfortable in no

## 2017-10-01 NOTE — PROGRESS NOTES
PROGRESS NOTE: Nyla Ferro MD     Patient - Jana Urrutia   MRN -  [id number]   Rosana # - [de-identified]   - 1940      Date of Admission -  2017  4:16 AM  Date of evaluation -  10/1/2017    Admit Date: 2017       10/1/2017    Subjective: alert, in chair, is receptive to wear the BIPAP    Objective:   Vitals: /60  Pulse 69  Temp 97 °F (36.1 °C) (Infrared)   Resp 21  Ht 5' 4\" (1.626 m)  Wt 250 lb (113.4 kg)  LMP  (LMP Unknown)  SpO2 97%  BMI 42.91 kg/m2  General appearance: alert, comfortable  Skin: Skin color, texture, turgor normal. No rash  HEENT: Head: Normocephalic, no lesions, without obvious abnormality. Neck:  Supple, no JVD, normal on examination  Lungs: diminished breath sounds bibasilar and rales bibasilar  Heart: ir-regular rate and rhythm, S1, S2 normal, P2 sounds normal  ABDOMEN: soft, bowel sounds positive, soft  Extremities: edema 3+ chronic edema;   SKIN: turgor normal, no rash seen  Neurologic: Mental status: Alert, oriented, thought content appropriate, Alert, oriented,; moves all extremities.        Data:   Scheduled Meds: Reviewed  Continuous Infusions:   dextrose         Intake/Output Summary (Last 24 hours) at 10/01/17 1120  Last data filed at 17 1800   Gross per 24 hour   Intake              859 ml   Output                0 ml   Net              859 ml     CBC:   Recent Labs      10/01/17   0541   WBC  7.2   HGB  8.3*   PLT  223     BMP:  Recent Labs      10/01/17   0541   NA  134*   K  4.3   CL  97*   CO2  24   BUN  35*   CREATININE  1.15*   GLUCOSE  127*       CBC Auto Differential [[id number]] (Abnormal) Collected: 10/01/17 0541      Updated: 10/01/17 0615      Specimen Source: Blood       WBC 7.2 k/uL      RBC 3.20 (L) m/uL      Hemoglobin 8.3 (L) g/dL      Hematocrit 26.0 (L) %      MCV 81.0 fL      MCH 25.8 (L) pg      MCHC 31.9 g/dL      RDW 18.0 (H) %      Platelets 223 k/uL      MPV NOT REPORTED fL      Differential Type NOT REPORTED      WBC Morphology NOT REPORTED      RBC Morphology NOT REPORTED      Platelet Estimate NOT REPORTED      Seg Neutrophils 68 %      Lymphocytes 22 %      Monocytes 6 %      Eosinophils % 3 %      Basophils 1 %      Segs Absolute 4.80 k/uL      Absolute Lymph # 1.60 k/uL      Absolute Mono # 0.50 k/uL      Absolute Eos # 0.20 k/uL      Basophils # 0.10 k/uL       Performed at [address], [address] [facility]    [phone]           Basic Metabolic Panel [[id number]] (Abnormal) Collected: 10/01/17 0541     Updated: 10/01/17 2718      Specimen Source: Blood       Glucose 127 (H) mg/dL      BUN 35 (H) mg/dL      CREATININE 1.15 (H) mg/dL      Bun/Cre Ratio 30 (H)      Calcium 8.6 mg/dL      Sodium 134 (L) mmol/L      Potassium 4.3 mmol/L      Chloride 97 (L) mmol/L      CO2 24 mmol/L      Anion Gap 13 mmol/L      GFR Non- 46 (L) mL/min      GFR  55 (L) mL/min      GFR Comment            Average GFR for 79or more years old:    65 mL/min/1.73sq m   Chronic Kidney Disease:    <60 mL/min/1.73sq m   Kidney failure:    <15 mL/min/1.73sq m               eGFR calculated using average adult body mass. Additional eGFR calculator    available at:         [email or web address]         Performed at [address], [facility]    [phone]            GFR Staging NOT REPORTED     PROTIME-INR [[id number]] (Abnormal) Collected: 10/01/17 0541     Updated: 10/01/17 0612      Specimen Source: Blood       Protime 12.0 (H) sec      INR 1.2              Therapeutic Range:    Moderate Anticoagulant Intensity:      INR = 2.0-3.0    High Anticoagulant Intensity:      INR = 2.5-3. 5         High anticoagulant intensity for patients with a mechanical prosthetic heart    valve, thrombosis and antiphospholipid syndrome, or myocardial infarction. Performed at [facility] [address], [address] [facility]    [phone]           POC Glucose Fingerstick [[id number]] (Abnormal) Collected: 09/30/17 2117     Updated: 09/30/17 2130       POC Glucose 184 (H) mg/dL     Hgb/Hct [[id number]] (Abnormal) Collected: 09/30/17 1949     Updated: 09/30/17 1955      Specimen Source: Blood       Hemoglobin 8.6 (L) g/dL      Hematocrit 27.4 (L) %       Performed at [facility] [address], [address] [facility]    [phone]           POC Glucose Fingerstick [[id number]] (Abnormal) Collected: 09/30/17 1721     Updated: 09/30/17 1740       POC Glucose 204 (H) mg/dL     VL LOWER EXTREMITY BILATERAL VENOUS DUPLEX [[id number]] Collected: 09/30/17 0929     Updated: 09/30/17 1515      Narrative:          Maria T   Vascular Lower Extremities DVT Study Procedure      Patient Name   ORTEGA       Date of Study           09/30/2017                 Thor Hoof      Date of Birth  1940  Gender                  Female      Age            76 year(s)  Race                          Room Number    8601      Corporate ID # [id number]      Patient Acct # [de-identified]      MR #           [id number]     Sonographer             Rolanda Chen      Accession #    [id number]   Interpreting Physician Kiana Velasco      Referring                  Referring Physician     Agustin Manrique   Practitioner     Procedure  Type of Study:      Veins: Lower Extremities DVT Study, Venous Scan Lower Bilateral.     Indications for Study:Swelling. Findings:      Right                                Left   The common femoral, femoral,         The common femoral, femoral,   popliteal, tibials, and saphenous    popliteal, tibials and saphenous   veins are compressible with normal   veins are compressible with normal   doppler responses.                   doppler responses.     Patient Status: In Patient.   Technical Quality:Adequate visualization. Velocities are measured in cm/s ; Diameters are measured in mm    Right Lower Extremities DVT Study Measurements  Right 2D Measurements  +------------------------------------+----------+---------------+----------+  ! Location                            ! Visualized! Compressibility! Thrombosis! +------------------------------------+----------+---------------+----------+  ! Common Femoral                      ! Yes       ! Yes            !None      !  +------------------------------------+----------+---------------+----------+  ! Prox Femoral                        ! Yes       ! Yes            !None      !  +------------------------------------+----------+---------------+----------+  ! Mid Femoral                         ! Yes       ! Yes            !None      !  +------------------------------------+----------+---------------+----------+  ! Dist Femoral                        ! Yes       ! Yes            !None      !  +------------------------------------+----------+---------------+----------+  ! Deep Femoral                        ! Yes       ! Yes            !None      !  +------------------------------------+----------+---------------+----------+  ! Popliteal                           ! Yes       ! Yes            !None      !  +------------------------------------+----------+---------------+----------+  ! Sapheno Femoral Junction            ! Yes       ! Yes            !None      !  +------------------------------------+----------+---------------+----------+  ! PTV                                 ! Yes       ! Yes            !None      !  +------------------------------------+----------+---------------+----------+  ! Peroneal                            ! Yes       ! Yes            !None      !  +------------------------------------+----------+---------------+----------+  ! Gastroc                             ! Yes       ! Yes            !None      !  +------------------------------------+----------+---------------+----------+    Left Doppler Measurements  +-----------------------------+------+------+------------------------------+  ! Location                     !Signal!Reflux! Reflux (sec)                  !  +-----------------------------+------+------+------------------------------+  ! Common Femoral               !Phasic!      !                              !  +-----------------------------+------+------+------------------------------+  ! Prox Femoral                 !Phasic!      !                              !  +-----------------------------+------+------+------------------------------+  ! Popliteal                    !Phasic!      !                              !  +-----------------------------+------+------+------------------------------+     Conclusions      Summary      No evidence of superficial or deep venous thrombosis in both lower   extremities. Edema noted.              Assessment/Discussion & Plan   COPD AEX  CHRONIC RES FAILURE, HYPOXEMIA  OBSTRUCTIVE SLEEP APNEA, NONCOMPLIANT  MORBID OBESITY  ATRIAL FIBRILLATION, NOW BACK ON COUMADIN    Coumadin started, d/w Dr Gabe Sears, no epistaxis now  With oxygen on, O2 Sat only 91%.  HR IR and 98  Electronically signed by Nighat Curtis MD on 10/1/2017 at 11:20 AM  Roslyn Mandel MD  PULMONARY, [address] [place] [address]   [facility]

## 2017-10-01 NOTE — PROGRESS NOTES
Follow-up diabetes  Doing well no polyuria or polydipsia no hypoglycemia blood sugars are better , reviewed  Review of systems no fever no chills no GI or  complaints no cardiopulmonary complaint. No bleeding no polyuria no headaches  Physical exam vitals stable  He smoked better no jaundice head normocephalic and atraumatic neck supple no JVD  Heart irregular no gallop,  Lungs air entry equal prolonged expiratory phase and no wheezes no crackles  extremeties good pulsesno Homans sign chronic lymphedema  Abdomen soft. Bowel sounds without any tenderness without organomegaly  Neuro alert and oriented ×3 no focal deficit  Assessment and plan  Nostril epistaxis resolved  Blood loss anemia stable  Type 2 diabetes with hyperglycemia better control  Acute kidney injury on top of CkD 3  A. fib chronic  copdexac   Chronic respiratory failure with hypoxia on home O2  CHARITO on CPAP  Noncompliance  Meds labs reviewed continue with insulin coverage .   Avoid nephrotoxic drugs, ambulate, physical therapy recommending  skilled nursing facility for rehab patient so far refusing OO physical therapy to reassess tomorrow if she can go home with visiting nurse home PTOT see orders coumadin 5 mg today

## 2017-10-02 VITALS
DIASTOLIC BLOOD PRESSURE: 63 MMHG | WEIGHT: 250 LBS | BODY MASS INDEX: 42.68 KG/M2 | HEART RATE: 73 BPM | RESPIRATION RATE: 20 BRPM | OXYGEN SATURATION: 93 % | SYSTOLIC BLOOD PRESSURE: 122 MMHG | TEMPERATURE: 97.9 F | HEIGHT: 64 IN

## 2017-10-02 LAB
ABSOLUTE EOS #: 0.2 K/UL (ref 0–0.4)
ABSOLUTE LYMPH #: 1.5 K/UL (ref 1–4.8)
ABSOLUTE MONO #: 0.5 K/UL (ref 0.2–0.8)
ANION GAP SERPL CALCULATED.3IONS-SCNC: 11 MMOL/L (ref 9–17)
BASOPHILS # BLD: 0 %
BASOPHILS ABSOLUTE: 0 K/UL (ref 0–0.2)
BUN BLDV-MCNC: 29 MG/DL (ref 8–23)
BUN/CREAT BLD: 28 (ref 9–20)
CALCIUM SERPL-MCNC: 8.7 MG/DL (ref 8.6–10.4)
CHLORIDE BLD-SCNC: 101 MMOL/L (ref 98–107)
CO2: 26 MMOL/L (ref 20–31)
CREAT SERPL-MCNC: 1.03 MG/DL (ref 0.5–0.9)
DIFFERENTIAL TYPE: ABNORMAL
EOSINOPHILS RELATIVE PERCENT: 4 %
GFR AFRICAN AMERICAN: >60 ML/MIN
GFR NON-AFRICAN AMERICAN: 52 ML/MIN
GFR SERPL CREATININE-BSD FRML MDRD: ABNORMAL ML/MIN/{1.73_M2}
GFR SERPL CREATININE-BSD FRML MDRD: ABNORMAL ML/MIN/{1.73_M2}
GLUCOSE BLD-MCNC: 113 MG/DL (ref 65–105)
GLUCOSE BLD-MCNC: 120 MG/DL (ref 70–99)
GLUCOSE BLD-MCNC: 131 MG/DL (ref 65–105)
GLUCOSE BLD-MCNC: 134 MG/DL (ref 65–105)
HCT VFR BLD CALC: 25.2 % (ref 36–46)
HEMOGLOBIN: 8.1 G/DL (ref 12–16)
INR BLD: 1.1
LYMPHOCYTES # BLD: 24 %
MCH RBC QN AUTO: 26.3 PG (ref 26–34)
MCHC RBC AUTO-ENTMCNC: 32 G/DL (ref 31–37)
MCV RBC AUTO: 81.9 FL (ref 80–100)
MONOCYTES # BLD: 8 %
PDW BLD-RTO: 19 % (ref 11.5–14.5)
PLATELET # BLD: 229 K/UL (ref 130–400)
PLATELET ESTIMATE: ABNORMAL
PMV BLD AUTO: 6.9 FL (ref 6–12)
POTASSIUM SERPL-SCNC: 4.4 MMOL/L (ref 3.7–5.3)
PROTHROMBIN TIME: 11.5 SEC (ref 9.7–11.6)
RBC # BLD: 3.07 M/UL (ref 4–5.2)
RBC # BLD: ABNORMAL 10*6/UL
SEG NEUTROPHILS: 64 %
SEGMENTED NEUTROPHILS ABSOLUTE COUNT: 4 K/UL (ref 1.8–7.7)
SODIUM BLD-SCNC: 138 MMOL/L (ref 135–144)
WBC # BLD: 6.3 K/UL (ref 3.5–11)
WBC # BLD: ABNORMAL 10*3/UL

## 2017-10-02 PROCEDURE — 2580000003 HC RX 258: Performed by: INTERNAL MEDICINE

## 2017-10-02 PROCEDURE — 6370000000 HC RX 637 (ALT 250 FOR IP): Performed by: INTERNAL MEDICINE

## 2017-10-02 PROCEDURE — 85025 COMPLETE CBC W/AUTO DIFF WBC: CPT

## 2017-10-02 PROCEDURE — 80048 BASIC METABOLIC PNL TOTAL CA: CPT

## 2017-10-02 PROCEDURE — 36415 COLL VENOUS BLD VENIPUNCTURE: CPT

## 2017-10-02 PROCEDURE — 94640 AIRWAY INHALATION TREATMENT: CPT

## 2017-10-02 PROCEDURE — 82947 ASSAY GLUCOSE BLOOD QUANT: CPT

## 2017-10-02 PROCEDURE — 6360000002 HC RX W HCPCS: Performed by: INTERNAL MEDICINE

## 2017-10-02 PROCEDURE — 85610 PROTHROMBIN TIME: CPT

## 2017-10-02 PROCEDURE — 2700000000 HC OXYGEN THERAPY PER DAY

## 2017-10-02 RX ORDER — CLOTRIMAZOLE 1 %
CREAM (GRAM) TOPICAL
Qty: 15 G | Refills: 1 | Status: SHIPPED | OUTPATIENT
Start: 2017-10-02 | End: 2017-10-09

## 2017-10-02 RX ORDER — BUMETANIDE 2 MG/1
1 TABLET ORAL DAILY
Qty: 30 TABLET | Refills: 3 | Status: SHIPPED | OUTPATIENT
Start: 2017-10-02 | End: 2017-11-01

## 2017-10-02 RX ORDER — INSULIN GLARGINE 100 [IU]/ML
16 INJECTION, SOLUTION SUBCUTANEOUS NIGHTLY
Qty: 1 VIAL | Refills: 0 | Status: SHIPPED | OUTPATIENT
Start: 2017-10-02

## 2017-10-02 RX ORDER — SENNA PLUS 8.6 MG/1
2 TABLET ORAL NIGHTLY
Qty: 60 TABLET | Refills: 0 | Status: SHIPPED | OUTPATIENT
Start: 2017-10-02 | End: 2017-11-01

## 2017-10-02 RX ORDER — CEPHALEXIN 500 MG/1
500 CAPSULE ORAL 2 TIMES DAILY
Qty: 8 CAPSULE | Refills: 0 | Status: SHIPPED | OUTPATIENT
Start: 2017-10-02

## 2017-10-02 RX ORDER — WARFARIN SODIUM 5 MG/1
5 TABLET ORAL DAILY
Qty: 30 TABLET | Refills: 0 | Status: SHIPPED | OUTPATIENT
Start: 2017-10-02

## 2017-10-02 RX ORDER — PSEUDOEPHEDRINE HCL 30 MG
100 TABLET ORAL 2 TIMES DAILY
Qty: 60 CAPSULE | Refills: 0 | Status: SHIPPED | OUTPATIENT
Start: 2017-10-02

## 2017-10-02 RX ADMIN — ATORVASTATIN CALCIUM 40 MG: 40 TABLET, FILM COATED ORAL at 08:58

## 2017-10-02 RX ADMIN — METOPROLOL SUCCINATE 50 MG: 50 TABLET, FILM COATED, EXTENDED RELEASE ORAL at 08:57

## 2017-10-02 RX ADMIN — VENLAFAXINE HYDROCHLORIDE 225 MG: 75 CAPSULE, EXTENDED RELEASE ORAL at 08:57

## 2017-10-02 RX ADMIN — ACETAMINOPHEN 650 MG: 325 TABLET ORAL at 10:38

## 2017-10-02 RX ADMIN — MICONAZOLE NITRATE: 1.42 POWDER TOPICAL at 08:57

## 2017-10-02 RX ADMIN — IPRATROPIUM BROMIDE AND ALBUTEROL SULFATE 1 AMPULE: .5; 3 SOLUTION RESPIRATORY (INHALATION) at 16:57

## 2017-10-02 RX ADMIN — HEPARIN SODIUM 5000 UNITS: 5000 INJECTION, SOLUTION INTRAVENOUS; SUBCUTANEOUS at 06:14

## 2017-10-02 RX ADMIN — Medication: at 08:57

## 2017-10-02 RX ADMIN — ACETAMINOPHEN 650 MG: 325 TABLET ORAL at 06:13

## 2017-10-02 RX ADMIN — ALLOPURINOL 100 MG: 100 TABLET ORAL at 08:58

## 2017-10-02 RX ADMIN — IPRATROPIUM BROMIDE AND ALBUTEROL SULFATE 1 AMPULE: .5; 3 SOLUTION RESPIRATORY (INHALATION) at 03:56

## 2017-10-02 RX ADMIN — IPRATROPIUM BROMIDE AND ALBUTEROL SULFATE 1 AMPULE: .5; 3 SOLUTION RESPIRATORY (INHALATION) at 00:07

## 2017-10-02 RX ADMIN — SALINE NASAL SPRAY 1 SPRAY: 1.5 SOLUTION NASAL at 08:57

## 2017-10-02 RX ADMIN — Medication 10 ML: at 08:58

## 2017-10-02 RX ADMIN — WARFARIN SODIUM 5 MG: 5 TABLET ORAL at 18:18

## 2017-10-02 RX ADMIN — CEPHALEXIN 500 MG: 500 CAPSULE ORAL at 08:57

## 2017-10-02 RX ADMIN — Medication: at 18:19

## 2017-10-02 RX ADMIN — DILTIAZEM HYDROCHLORIDE 120 MG: 120 CAPSULE, COATED, EXTENDED RELEASE ORAL at 08:58

## 2017-10-02 RX ADMIN — CLOTRIMAZOLE: 10 CREAM TOPICAL at 08:57

## 2017-10-02 RX ADMIN — IPRATROPIUM BROMIDE AND ALBUTEROL SULFATE 1 AMPULE: .5; 3 SOLUTION RESPIRATORY (INHALATION) at 08:39

## 2017-10-02 RX ADMIN — DOCUSATE SODIUM 100 MG: 100 CAPSULE, LIQUID FILLED ORAL at 08:58

## 2017-10-02 RX ADMIN — HEPARIN SODIUM 5000 UNITS: 5000 INJECTION, SOLUTION INTRAVENOUS; SUBCUTANEOUS at 14:00

## 2017-10-02 ASSESSMENT — PAIN SCALES - GENERAL
PAINLEVEL_OUTOF10: 3
PAINLEVEL_OUTOF10: 6

## 2017-10-02 NOTE — PLAN OF CARE
Problem: Falls - Risk of  Goal: Absence of falls  Outcome: Ongoing  Pt fall risk, fall band present, falling star, safety alarm activated and in use as needed. Hourly rounding performed. Pt encouraged to use call light. See Tristan Kumar fall risk assessment.

## 2017-10-02 NOTE — PLAN OF CARE
Problem: Nutrition  Goal: Optimal nutrition therapy  Nutrition Problem: Increased nutrient needs  Intervention: Food and/or Nutrient Delivery: Modify current diet, Modify current ONS  Nutritional Goals: PO intake to meet >75% of estimated kcal/protein needs, with good glycemic control  Outcome: Ongoing

## 2017-10-02 NOTE — PROGRESS NOTES
Cardiovascular progress Note - Coverage for [facility]         Patient name: Grace Miranda    YOB: 1940  Date of admission:  9/25/2017       Patient seen, examined. Previous clinical entries reviewed. All available laboratory, imaging and ancillary data reviewed. Subjective:      Demi Lindquist is a 67 y. o. female, who follows with [facility] for her cardiac care, with past medical history significant for non-ischemic cardiomyopathy with normalized LVEF, chronic diastolic heart failure, and chronic atrial fibrillation. She was admitted on 9/25/17 for epistaxis, cardiology following given her significant cardiac history. No acute events over the weekend. Remains hemodynamically stable, no new events on telemetry. Labs with stable Hg.     She denies chest pain, palpitations, or worsening dyspnea. She has chronic lymphedema. No further issues with epistaxis.     Cardiac Data:  Coronary arteries:Normal coronaries per cath 6/2010  Structural Heart: Mild LVH, Mild aortic stenosis. Electrical Abnormalities: pSVT, New onset atrial fibrillation  Heart Failure: Chronic Diastolic Stage: C FC: III  Ventricular function: Hx of NICMP with Normalized LVEF, LVEF 74%, Grade I Diastolic Dysfunction     Systems review:  Constitutional: No fever/chills. HENT: No headache, neck pain or neck stiffness. No sore throat or dysphagia. Gastrointestinal: No abdominal pain, nausea or vomiting.    Cardiac: As Above  Respiratory: As above  Neurologic: No new focal weakness or numbness  Psychiatric: + Anxiety/Depression      Examination:   Vitals: /63  Pulse 68  Temp 98 °F (36.7 °C) (Oral)   Resp 18  Ht 5' 4\" (1.626 m)  Wt 250 lb (113.4 kg)  LMP  (LMP Unknown)  SpO2 90%  BMI 42.91 kg/m2    Intake/Output Summary (Last 24 hours) at 10/02/17 0801  Last data filed at 10/01/17 1300   Gross per 24 hour   Intake              240 ml   Output                0 ml   Net              240 ml     General appearance: Comfortable in no apparent distress. HEENT: No pallor. No icterus  Neck: Supple. Lungs:Generally decreased breath sounds  Heart: Irregular S1,S2  Abdomen: Obese, soft, non-tender  Extremities: + peripheral edema- chronic LLE lymphedema  Skin: No obvious rashes. Musculoskeletal: No obvious deformities. Neurologic: No focal deficits. Labs/ Ancillary data:     CBC:   Recent Labs      09/30/17   0509   10/01/17   0541  10/01/17   1956  10/02/17   0733   WBC  8.1   --   7.2   --   6.3   HGB  8.1*   < >  8.3*  8.3*  8.1*   PLT  205   --   223   --   229    < > = values in this interval not displayed.      BMP:  Recent Labs      10/01/17   0541  10/02/17   0733   NA  134*  138   K  4.3  4.4   CL  97*  101   CO2  24  26   BUN  35*  29*   CREATININE  1.15*  1.03*   GLUCOSE  127*  120*     INR:   Recent Labs      09/30/17   0509  10/01/17   0541  10/02/17   0733   INR  1.1  1.2  1.1     Medications:   Scheduled Meds:   clotrimazole   Topical BID    cephALEXin  500 mg Oral BID    heparin (porcine)  5,000 Units Subcutaneous 3 times per day    miconazole   Topical BID    saline nasal gel   Nasal BID    oxymetazoline  2 spray Each Nare BID    lactulose  20 g Oral BID    warfarin  5 mg Oral Daily    warfarin (COUMADIN) daily dosing (placeholder)   Other RX Placeholder    ipratropium-albuterol  1 ampule Inhalation Q4H    senna  2 tablet Oral Nightly    docusate sodium  100 mg Oral BID    insulin glargine  16 Units Subcutaneous Nightly    sodium chloride  250 mL Intravenous Once    tranexamic acid  500 mg Topical Once    sterile water  5 mL Injection Once    sodium chloride flush  10 mL Intravenous 2 times per day    allopurinol  100 mg Oral Daily    atorvastatin  40 mg Oral Daily    diltiazem  120 mg Oral Daily    metoprolol succinate  50 mg Oral Daily    venlafaxine  225 mg Oral Daily    insulin lispro  0-3 Units Subcutaneous Nightly    insulin lispro  0-6 Units Subcutaneous TID WC     Continuous Infusions:  

## 2017-10-02 NOTE — PROGRESS NOTES
Nutrition Assessment    Type and Reason for Visit: Reassess    Nutrition Recommendations: 1. Continue 1,800 kcal, 4 CHO/meal diet   2. Modify Ensure Clear to Gab x 2/day. 3. Diet instruction placed on pt table for Diabetic diet, along with anticoagulation diet guidelines. Malnutrition Assessment:  · Malnutrition Status: At risk for malnutrition  · Context:    · Findings of the 6 clinical characteristics of malnutrition (Minimum of 2 out of 6 clinical characteristics is required to make the diagnosis of moderate or severe Protein Calorie Malnutrition based on AND/ASPEN Guidelines):  1. Energy Intake-Not available, not able to assess    2. Weight Loss-2% loss or greater,  (in 14 months)  3. Fat Loss-No significant subcutaneous fat loss, Orbital  4. Muscle Loss-No significant muscle mass loss, Temples (temporalis muscle)  5. Fluid Accumulation-Mild fluid accumulation, Generalized  6.  Strength-Not measured    Nutrition Diagnosis:   · Problem: Increased nutrient needs  · Etiology: related to Endocrine dysfunction     Signs and symptoms:  as evidenced by Presence of wounds (+5.2 kg--4.4% since 8/2/16)    Nutrition Assessment:  · Subjective Assessment: Pt is sleeping at time of visit. Spoke with Donna Mcdowell (RN). Pt refused lactulose; noting soft bowel movement last evening. Pt is consuming % of meals. Diet instruction for Diabetes, anticoaugation placed on pt table.    · Nutrition-Focused Physical Findings: GI:  +rotund, soft, +last BM (9/24), +hypoactive bowel sounds; PV:  +Generalized, +1, RLE: +1 non-pitting, LLE: +3 non-pitting, non-pitting; Skin:  +redness/skin abrasions, scattered, +PU, stage I to II, under breasts  · Wound Type: Multiple, Pressure Ulcer, Skin Tears  · Current Nutrition Therapies:  · Oral Diet Orders: Carb Control 4 Carbs/Meal   · Oral Diet intake: %  · Oral Nutrition Supplement (ONS) Orders: Standard High Calorie Oral Supplement  · ONS intake: Unable to assess  · Anthropometric Measures:  · Ht: 5' 4\" (162.6 cm)   · Current Body Wt: 250 lb (113.4 kg)  · Admission Body Wt: 250 lb (113.4 kg)  · Usual Body Wt: 261 lb 7.5 oz (118.6 kg) (8/2/16)  · % Weight Change: 4.4%,  x 14 months  · Ideal Body Wt: 134 lb 7.7 oz (61 kg), % Ideal Body 186%  · BMI Classification: BMI > or equal to 40.0 Obese Class III  · Comparative Standards (Estimated Nutrition Needs):  · Estimated Daily Total Kcal: 1,850-2,000 kcal  · Estimated Daily Protein (g): 75-85 g    Estimated Intake vs Estimated Needs: Intake Meets Needs    Nutrition Risk Level: Moderate    Nutrition Interventions:   Modify current diet, Modify current ONS  Continued Inpatient Monitoring, Coordination of Care    Nutrition Evaluation:   · Evaluation: Goals set   · Goals: PO intake to meet >75% of estimated kcal/protein needs, with good glycemic control    · Monitoring: Meal Intake, Supplement Intake, Diet Tolerance, Skin Integrity, Wound Healing, Ascites/Edema, Weight, Pertinent Labs, Nausea or Vomiting    See Adult Nutrition Doc Flowsheet for more detail.      Electronically signed by Thang BLUE, DORA, ANA on 10/2/2017 at 2:25 PM    Contact Number:  [phone]

## 2017-10-02 NOTE — PLAN OF CARE
Problem: Falls - Risk of  Intervention: Fall risk assessment  Fall risk this shift, falling star program in place, call light and personnel items within reach, bed low locked position.

## 2017-10-02 NOTE — FLOWSHEET NOTE
Patient sleeping. No visitors present. Per RN, patient is to be discharged today. Writer leaves note of support.      10/02/17 9179   Encounter Summary   Services provided to: Patient   Referral/Consult From: Rounding   Continue Visiting (10/2/17, sleeping, left note)   Complexity of Encounter Low   Length of Encounter 15 minutes   Routine   Type Follow up   Assessment Sleeping   Intervention Prayer   Outcome Did not respond

## 2017-10-02 NOTE — PROGRESS NOTES
Follow-up diabetes  No polyuria nor polydipsia no hypoglycemia blood sugars reviewed   review of systems no fever no chills no GI/ complaints or complaints no TIA no bleeding no headaches   physical exam vitals are stable  Eyes mild pallor no jaundice  Entry equal prolonged expiratory phase. Heart irregular no tachycardia no gallop  Abdomen soft with sounds without any tenderness without any megaly  Extremities good pulses positive lymphedema  Neuro alert and oriented ×3 no focal deficit   assessment and plan  Epistaxis secondary to Coumadin. resolved   Chronic A. fib with well-controlled rate  Chronic diastolic CHF/Bumex at a lower dose and Aldactone  Chronic respiratory failure with hypoxemia on home O2  COPD exacerbation better  CHARITO on CPAP   acute blood loss anemia  Type 2 diabetes with hyperglycemia and renal insufficiency  ckD3  Acute kidney injury results  Morbid obesity secondary to excess calories  Home today with visiting home nurse home [facility] patient refused skilled nursing facility

## 2017-10-02 NOTE — DISCHARGE SUMMARY
Physician Discharge Summary     Patient ID:  Lauren Ramirez  [id number]  77 y.o.  1940    Admit date: 9/25/2017    Discharge date and time:  October 2, 2017    Admission Diagnoses:   Patient Active Problem List   Diagnosis    [facility]    Diabetes mellitus with hyperglycemia ([facility]    Hypertension    Hyperlipidemia    Depression    Morbid obesity ([facility]    Arthritis of right knee    Gout attack    Arthritis of right shoulder region    [facility], [facility]    UTI (urinary tract infection)    Asthmatic bronchitis with exacerbation    Hypomagnesemia    Type 2 diabetes mellitus without complication, with long-term current use of insulin ([facility]    Paroxysmal SVT (supraventricular tachycardia) ([facility])    CHF (congestive heart failure) ([facility])    Epistaxis    Acute blood loss anemia    ADRIANE (acute kidney injury) ([facility])    Chronic respiratory failure with hypoxia ([facility])    Slow transit constipation    Left leg swelling    Chronic atrial fibrillation ([facility]       Discharge Diagnoses: Epistaxis left nostril secondary to hypercoagulation from Coumadin therapy  Acute blood loss anemia  Chronic A. fib with controlled rate   chronic diastolic CHF  Chronic respiratory failure with hypoxemia on home O2  COPD exacerbation  CHARITO on CPAP  Morbid obesity due to excess calories  Acute kidney injury resolved  Slow transit constipation  Type 2 diabetes mellitus with hyperglycemia and renal insufficiency  ckd3    Consults: cardiology, pulmonary/intensive care and vascular surgery ent     Procedures: None    Hospital Course: Patient admitted with left epistaxis and acute blood loss anemia needed a transfusion her INR was high patient was given vitamin K and fresh frozen plasma packed RBC transfusions patient did well and was admitted to the ICU.   Packing the left nostril was done in ER IV antibiotics and pain in ent , during her stay she was complaining of constipation Colace  Senokot and lactulose were given , patient refused rehab or skilled nursing facility was discharged home in stable improved condition  Discharge Exam:  See progress note from today    Disposition: home  Stable improved  Patient Instructions:   Current Discharge Medication List   o2 2 l nc    START taking these medications    Details   insulin glargine (LANTUS) 100 UNIT/ML injection vial Inject 16 Units into the skin nightly  Qty: 1 vial, Refills: 0      cephALEXin (KEFLEX) 500 MG capsule Take 1 capsule by mouth 2 times daily  Qty: 8 capsule, Refills: 0      sodium chloride (OCEAN, BABY AYR) 0.65 % nasal spray 1 spray by Nasal route every 2 hours as needed (while awake)  Qty: 1 Bottle, Refills: 0      clotrimazole (LOTRIMIN) 1 % cream Apply topically 2 times daily.   Qty: 15 g, Refills: 1      docusate sodium (COLACE, DULCOLAX) 100 MG CAPS Take 100 mg by mouth 2 times daily  Qty: 60 capsule, Refills: 0      senna (SENOKOT) 8.6 MG tablet Take 2 tablets by mouth nightly  Qty: 60 tablet, Refills: 0         CONTINUE these medications which have CHANGED    Details   bumetanide (BUMEX) 2 MG tablet Take 0.5 tablets by mouth daily Indications: 1 tablet alternating with 1.5 tablet every other day  Qty: 30 tablet, Refills: 3      warfarin (COUMADIN) 5 MG tablet Take 1 tablet by mouth daily  Qty: 30 tablet, Refills: 0         CONTINUE these medications which have NOT CHANGED    Details   spironolactone (ALDACTONE) 25 MG tablet Take 25 mg by mouth daily      metoprolol succinate (TOPROL XL) 50 MG extended release tablet Take 50 mg by mouth daily      diltiazem (CARDIZEM CD) 120 MG extended release capsule Take 120 mg by mouth daily      venlafaxine (EFFEXOR-XR) 75 MG XR capsule Take 225 mg by mouth daily       atorvastatin (LIPITOR) 40 MG tablet Take 40 mg by mouth daily      levalbuterol (XOPENEX) 1.25 MG/0.5ML nebulizer solution Take 0.5 mLs by nebulization every 4 hours as needed for Wheezing  Qty: 1 each, Refills: 3      magnesium (MAGNESIUM-OXIDE) 250 MG TABS tablet Take 500 mg by mouth 2 times daily Indications: 2 tab BID       omeprazole (PRILOSEC) 20 MG capsule Take 20 mg by mouth daily. !! insulin lispro (HUMALOG) 100 UNIT/ML injection vial Inject 0-12 Units into the skin 3 times daily (with meals)  Qty: 1 vial, Refills: 3      !! insulin lispro (HUMALOG) 100 UNIT/ML injection vial Inject 0-6 Units into the skin nightly  Qty: 1 vial, Refills: 3      allopurinol (ZYLOPRIM) 100 MG tablet Take 1 tablet by mouth daily  Qty: 30 tablet, Refills: 3      nitroGLYCERIN (NITROSTAT) 0.4 MG SL tablet Place 0.4 mg under the tongue every 5 minutes as needed for Chest pain       !! - Potential duplicate medications found. Please discuss with provider. STOP taking these medications       Insulin NPH Human, Isophane, (NOVOLIN N SC) Comments:   Reason for Stopping:         apixaban (ELIQUIS) 5 MG TABS tablet Comments:   Reason for Stopping:         aspirin 325 MG tablet Comments:   Reason for Stopping:             Activity: activity as tolerated with walker  Diet: diabetic diet and No evidence of 2000 mL fluid restriction    Follow-up with pcp in 1 week. Cardiology in 3 weeks, community referral completed , we'll check PT/INR on Wednesday and Thursday this week and then weekly E check CBC and BMP on Friday patient advised to avoid nephrotoxic drugs more than 35 minutes spent on discharge  Signed:   Chapis Taylor MD  10/2/2017  6:54 PM

## 2017-10-02 NOTE — DISCHARGE INSTR - DIET

## 2017-10-02 NOTE — PROGRESS NOTES
PROGRESS NOTE: PULMONARY & CRITICAL Aleksandra Martinez MD     Patient - Karen Yeh   MRN -  [id number]   Rosana # - [de-identified]   - 1940      Date of Admission -  2017  4:16 AM  Date of evaluation -  10/2/2017    Admit Date: 2017       10/2/2017    Subjective: DROWSY, DID NOT HAVE BIPAP LAST NIGHT    Objective:   Vitals: /63  Pulse 68  Temp 98 °F (36.7 °C) (Oral)   Resp 18  Ht 5' 4\" (1.626 m)  Wt 250 lb (113.4 kg)  LMP  (LMP Unknown)  SpO2 90%  BMI 42.91 kg/m2  General appearance: alert, comfortable  Skin: Skin color, texture, turgor normal. No rash  HEENT: Head: Normocephalic, no lesions, without obvious abnormality. CROWDED OP  Neck:  Supple, no JVD, normal on examination  Lungs: diminished breath sounds bibasilar and rales bibasilar, IMPROVING  Heart: ir-regular rate and rhythm, S1, S2 normal, P2 sounds normal  ABDOMEN: soft, bowel sounds positive, soft  Extremities: edema 3+ chronic edema;   SKIN: turgor normal, no rash seen  Neurologic: Mental status: Alert, oriented, thought content appropriate, Alert, oriented,; moves all extremities.        Data:   Scheduled Meds: Reviewed  Continuous Infusions:   dextrose         Intake/Output Summary (Last 24 hours) at 10/02/17 1225  Last data filed at 10/01/17 1300   Gross per 24 hour   Intake              120 ml   Output                0 ml   Net              120 ml     CBC:   Recent Labs      10/02/17   0733   WBC  6.3   HGB  8.1*   PLT  229     BMP:  Recent Labs      10/02/17   0733   NA  138   K  4.4   CL  101   CO2  26   BUN  29*   CREATININE  1.03*   GLUCOSE  120*       CBC Auto Differential [[id number]] (Abnormal) Collected: 10/01/17 0541      Updated: 10/01/17 0615      Specimen Source: Blood       WBC 7.2 k/uL      RBC 3.20 (L) m/uL      Hemoglobin 8.3 (L) g/dL      Hematocrit 26.0 (L) %      MCV 81.0 fL      MCH 25.8 (L) pg      MCHC 31.9 g/dL      RDW 18.0 (H) %     infarction. Performed at [facility] [address], [address] [facility]    [phone]           POC Glucose Fingerstick [[id number]] (Abnormal) Collected: 09/30/17 2117     Updated: 09/30/17 2130       POC Glucose 184 (H) mg/dL     Hgb/Hct [[id number]] (Abnormal) Collected: 09/30/17 1949     Updated: 09/30/17 1955      Specimen Source: Blood       Hemoglobin 8.6 (L) g/dL      Hematocrit 27.4 (L) %       Performed at [facility] [address], [address] [facility]    [phone]           POC Glucose Fingerstick [[id number]] (Abnormal) Collected: 09/30/17 1721     Updated: 09/30/17 1740       POC Glucose 204 (H) mg/dL     VL LOWER EXTREMITY BILATERAL VENOUS DUPLEX [[id number]] Collected: 09/30/17 0929     Updated: 09/30/17 1515      Narrative:          Maria T   Vascular Lower Extremities DVT Study Procedure      Patient Name   ORTEAG       Date of Study           09/30/2017                 Maritza Azul      Date of Birth  1940  Gender                  Female      Age            76 year(s)  Race                          Room Number    3365      Corporate ID # [id number]      Patient Acct # [de-identified]      MR #           [id number]     Sonographer             Rolanda Chen      Accession #    [id number]   Interpreting Physician Jordan Cooper      Referring                  Referring Physician     Darren Su    Practitioner     Procedure  Type of Study:      Veins: Lower Extremities DVT Study, Venous Scan Lower Bilateral.     Indications for Study:Swelling. Findings:      Right                                Left   The common femoral, femoral,         The common femoral, femoral,   popliteal, tibials, and saphenous    popliteal, tibials and saphenous   veins are compressible with normal   veins are compressible with normal   doppler responses.                   doppler responses.     Patient Status: In Patient.   Technical Quality:Adequate visualization. Velocities are measured in cm/s ; Diameters are measured in mm    Right Lower Extremities DVT Study Measurements  Right 2D Measurements  +------------------------------------+----------+---------------+----------+  ! Location                            ! Visualized! Compressibility! Thrombosis! +------------------------------------+----------+---------------+----------+  ! Common Femoral                      ! Yes       ! Yes            !None      !  +------------------------------------+----------+---------------+----------+  ! Prox Femoral                        ! Yes       ! Yes            !None      !  +------------------------------------+----------+---------------+----------+  ! Mid Femoral                         ! Yes       ! Yes            !None      !  +------------------------------------+----------+---------------+----------+  ! Dist Femoral                        ! Yes       ! Yes            !None      !  +------------------------------------+----------+---------------+----------+  ! Deep Femoral                        ! Yes       ! Yes            !None      !  +------------------------------------+----------+---------------+----------+  ! Popliteal                           ! Yes       ! Yes            !None      !  +------------------------------------+----------+---------------+----------+  ! Sapheno Femoral Junction            ! Yes       ! Yes            !None      !  +------------------------------------+----------+---------------+----------+  ! PTV                                 ! Yes       ! Yes            !None      !  +------------------------------------+----------+---------------+----------+  ! Peroneal                            ! Yes       ! Yes            !None      !  +------------------------------------+----------+---------------+----------+  ! Gastroc                             ! Yes       ! Yes            !None      !  +------------------------------------+----------+---------------+----------+  ! GSV Thigh                           ! Yes       ! Yes            !None      !  +------------------------------------+----------+---------------+----------+  ! GSV Knee                            ! Yes       ! Yes            !None      !  +------------------------------------+----------+---------------+----------+  ! GSV Ankle                           ! Yes       ! Yes            !None      !  +------------------------------------+----------+---------------+----------+  ! SSV                                 ! Yes       ! Yes            !None      !  +------------------------------------+----------+---------------+----------+    Right Doppler Measurements  +-----------------------------+------+------+------------------------------+  ! Location                     !Signal!Reflux! Reflux (sec)                  !  +-----------------------------+------+------+------------------------------+  ! Common Femoral               !Phasic!      !                              !  +-----------------------------+------+------+------------------------------+  ! Prox Femoral                 !Phasic!      !                              !  +-----------------------------+------+------+------------------------------+  ! Popliteal                    !Phasic!      !                              !  +-----------------------------+------+------+------------------------------+    Left Lower Extremities DVT Study Measurements  Left 2D Measurements  +------------------------------------+----------+---------------+----------+  ! Location                            ! Visualized! Compressibility! Thrombosis! +------------------------------------+----------+---------------+----------+  ! Common Femoral                      ! Yes       ! Yes            !None      !  +------------------------------------+----------+---------------+----------+  ! Prox Femoral                        ! Yes       !Yes            !None      !  +------------------------------------+----------+---------------+----------+  ! Mid Femoral                         ! Yes       ! Yes            !None      !  +------------------------------------+----------+---------------+----------+  ! Dist Femoral                        ! Yes       ! Yes            !None      !  +------------------------------------+----------+---------------+----------+  ! Deep Femoral                        ! Yes       ! Yes            !None      !  +------------------------------------+----------+---------------+----------+  ! Popliteal                           ! Yes       ! Yes            !None      !  +------------------------------------+----------+---------------+----------+  ! Sapheno Femoral Junction            ! Yes       ! Yes            !None      !  +------------------------------------+----------+---------------+----------+  ! PTV                                 ! Yes       ! Yes            !None      !  +------------------------------------+----------+---------------+----------+  ! Peroneal                            ! Yes       ! Yes            !None      !  +------------------------------------+----------+---------------+----------+  ! Gastroc                             ! Yes       ! Yes            !None      !  +------------------------------------+----------+---------------+----------+  ! GSV Thigh                           ! Yes       ! Yes            !None      !  +------------------------------------+----------+---------------+----------+  ! GSV Knee                            ! Yes       ! Yes            !None      !  +------------------------------------+----------+---------------+----------+  ! GSV Ankle                           ! Yes       ! Yes            !None      !  +------------------------------------+----------+---------------+----------+  ! SSV                                 ! Yes       ! Yes            !None

## 2024-02-10 ENCOUNTER — HOSPITAL ENCOUNTER (INPATIENT)
Age: 84
LOS: 4 days | Discharge: NURSING, REHAB OR LONG-TERM CARE | DRG: 384 | End: 2024-02-14
Attending: EMERGENCY MEDICINE | Admitting: HOSPITALIST
Payer: MEDICARE

## 2024-02-10 ENCOUNTER — APPOINTMENT (OUTPATIENT)
Dept: CT IMAGING | Age: 84
DRG: 384 | End: 2024-02-10
Payer: MEDICARE

## 2024-02-10 DIAGNOSIS — R11.2 NAUSEA AND VOMITING, UNSPECIFIED VOMITING TYPE: ICD-10-CM

## 2024-02-10 DIAGNOSIS — R19.7 DIARRHEA, UNSPECIFIED TYPE: Primary | ICD-10-CM

## 2024-02-10 DIAGNOSIS — R11.2 INTRACTABLE NAUSEA AND VOMITING: ICD-10-CM

## 2024-02-10 LAB
ALBUMIN SERPL-MCNC: 3.8 G/DL (ref 3.5–5.2)
ALP SERPL-CCNC: 162 U/L (ref 35–104)
ALT SERPL-CCNC: 12 U/L (ref 5–33)
ANION GAP SERPL CALCULATED.3IONS-SCNC: 16 MMOL/L (ref 9–17)
AST SERPL-CCNC: 28 U/L
BASOPHILS # BLD: 0.06 K/UL (ref 0–0.2)
BASOPHILS NFR BLD: 1 % (ref 0–2)
BILIRUB SERPL-MCNC: 1.4 MG/DL (ref 0.3–1.2)
BUN SERPL-MCNC: 48 MG/DL (ref 8–23)
BUN/CREAT SERPL: 34 (ref 9–20)
CALCIUM SERPL-MCNC: 9.6 MG/DL (ref 8.6–10.4)
CHLORIDE SERPL-SCNC: 96 MMOL/L (ref 98–107)
CO2 SERPL-SCNC: 29 MMOL/L (ref 20–31)
CREAT SERPL-MCNC: 1.4 MG/DL (ref 0.5–0.9)
EOSINOPHIL # BLD: 0.11 K/UL (ref 0–0.44)
EOSINOPHILS RELATIVE PERCENT: 2 % (ref 1–4)
ERYTHROCYTE [DISTWIDTH] IN BLOOD BY AUTOMATED COUNT: 18.8 % (ref 11.8–14.4)
GFR SERPL CREATININE-BSD FRML MDRD: 37 ML/MIN/1.73M2
GLUCOSE BLD-MCNC: 111 MG/DL (ref 65–105)
GLUCOSE SERPL-MCNC: 85 MG/DL (ref 70–99)
HCT VFR BLD AUTO: 40.3 % (ref 36.3–47.1)
HGB BLD-MCNC: 11.9 G/DL (ref 11.9–15.1)
IMM GRANULOCYTES # BLD AUTO: 0.01 K/UL (ref 0–0.3)
IMM GRANULOCYTES NFR BLD: 0 %
LIPASE SERPL-CCNC: 34 U/L (ref 13–60)
LYMPHOCYTES NFR BLD: 1.56 K/UL (ref 1.1–3.7)
LYMPHOCYTES RELATIVE PERCENT: 26 % (ref 24–43)
MCH RBC QN AUTO: 27 PG (ref 25.2–33.5)
MCHC RBC AUTO-ENTMCNC: 29.5 G/DL (ref 28.4–34.8)
MCV RBC AUTO: 91.4 FL (ref 82.6–102.9)
MONOCYTES NFR BLD: 0.54 K/UL (ref 0.1–1.2)
MONOCYTES NFR BLD: 9 % (ref 3–12)
NEUTROPHILS NFR BLD: 62 % (ref 36–65)
NEUTS SEG NFR BLD: 3.66 K/UL (ref 1.5–8.1)
NRBC BLD-RTO: 0 PER 100 WBC
PLATELET # BLD AUTO: 234 K/UL (ref 138–453)
PMV BLD AUTO: 9.2 FL (ref 8.1–13.5)
POTASSIUM SERPL-SCNC: 4.2 MMOL/L (ref 3.7–5.3)
PROT SERPL-MCNC: 8.1 G/DL (ref 6.4–8.3)
RBC # BLD AUTO: 4.41 M/UL (ref 3.95–5.11)
RBC # BLD: ABNORMAL 10*6/UL
SODIUM SERPL-SCNC: 141 MMOL/L (ref 135–144)
WBC OTHER # BLD: 5.9 K/UL (ref 3.5–11.3)

## 2024-02-10 PROCEDURE — 96376 TX/PRO/DX INJ SAME DRUG ADON: CPT

## 2024-02-10 PROCEDURE — 82947 ASSAY GLUCOSE BLOOD QUANT: CPT

## 2024-02-10 PROCEDURE — 6360000002 HC RX W HCPCS: Performed by: NURSE PRACTITIONER

## 2024-02-10 PROCEDURE — 96361 HYDRATE IV INFUSION ADD-ON: CPT

## 2024-02-10 PROCEDURE — 2580000003 HC RX 258: Performed by: EMERGENCY MEDICINE

## 2024-02-10 PROCEDURE — C9113 INJ PANTOPRAZOLE SODIUM, VIA: HCPCS | Performed by: NURSE PRACTITIONER

## 2024-02-10 PROCEDURE — 74176 CT ABD & PELVIS W/O CONTRAST: CPT

## 2024-02-10 PROCEDURE — 2580000003 HC RX 258: Performed by: NURSE PRACTITIONER

## 2024-02-10 PROCEDURE — A4216 STERILE WATER/SALINE, 10 ML: HCPCS | Performed by: NURSE PRACTITIONER

## 2024-02-10 PROCEDURE — 1200000000 HC SEMI PRIVATE

## 2024-02-10 PROCEDURE — 96374 THER/PROPH/DIAG INJ IV PUSH: CPT

## 2024-02-10 PROCEDURE — 85025 COMPLETE CBC W/AUTO DIFF WBC: CPT

## 2024-02-10 PROCEDURE — 99285 EMERGENCY DEPT VISIT HI MDM: CPT

## 2024-02-10 PROCEDURE — 2580000003 HC RX 258: Performed by: REGISTERED NURSE

## 2024-02-10 PROCEDURE — 83690 ASSAY OF LIPASE: CPT

## 2024-02-10 PROCEDURE — 6360000002 HC RX W HCPCS: Performed by: EMERGENCY MEDICINE

## 2024-02-10 PROCEDURE — 80053 COMPREHEN METABOLIC PANEL: CPT

## 2024-02-10 RX ORDER — ENOXAPARIN SODIUM 100 MG/ML
40 INJECTION SUBCUTANEOUS DAILY
Status: DISCONTINUED | OUTPATIENT
Start: 2024-02-10 | End: 2024-02-14 | Stop reason: HOSPADM

## 2024-02-10 RX ORDER — PROCHLORPERAZINE EDISYLATE 5 MG/ML
10 INJECTION INTRAMUSCULAR; INTRAVENOUS EVERY 6 HOURS PRN
Status: DISCONTINUED | OUTPATIENT
Start: 2024-02-10 | End: 2024-02-14 | Stop reason: HOSPADM

## 2024-02-10 RX ORDER — ACETAMINOPHEN 650 MG/1
650 SUPPOSITORY RECTAL EVERY 6 HOURS PRN
Status: DISCONTINUED | OUTPATIENT
Start: 2024-02-10 | End: 2024-02-14 | Stop reason: HOSPADM

## 2024-02-10 RX ORDER — ONDANSETRON 4 MG/1
4 TABLET, ORALLY DISINTEGRATING ORAL EVERY 8 HOURS PRN
Status: DISCONTINUED | OUTPATIENT
Start: 2024-02-10 | End: 2024-02-14 | Stop reason: HOSPADM

## 2024-02-10 RX ORDER — ONDANSETRON 2 MG/ML
4 INJECTION INTRAMUSCULAR; INTRAVENOUS EVERY 6 HOURS PRN
Status: DISCONTINUED | OUTPATIENT
Start: 2024-02-10 | End: 2024-02-14 | Stop reason: HOSPADM

## 2024-02-10 RX ORDER — CALCIUM CARBONATE 500 MG/1
500 TABLET, CHEWABLE ORAL 3 TIMES DAILY PRN
Status: DISCONTINUED | OUTPATIENT
Start: 2024-02-10 | End: 2024-02-14 | Stop reason: HOSPADM

## 2024-02-10 RX ORDER — ONDANSETRON 2 MG/ML
4 INJECTION INTRAMUSCULAR; INTRAVENOUS EVERY 6 HOURS PRN
Status: DISCONTINUED | OUTPATIENT
Start: 2024-02-10 | End: 2024-02-10

## 2024-02-10 RX ORDER — ONDANSETRON 2 MG/ML
4 INJECTION INTRAMUSCULAR; INTRAVENOUS ONCE
Status: COMPLETED | OUTPATIENT
Start: 2024-02-10 | End: 2024-02-10

## 2024-02-10 RX ORDER — ONDANSETRON 4 MG/1
4 TABLET, FILM COATED ORAL 3 TIMES DAILY PRN
Qty: 15 TABLET | Refills: 0 | Status: SHIPPED | OUTPATIENT
Start: 2024-02-10

## 2024-02-10 RX ORDER — MAGNESIUM SULFATE 1 G/100ML
1000 INJECTION INTRAVENOUS PRN
Status: DISCONTINUED | OUTPATIENT
Start: 2024-02-10 | End: 2024-02-14 | Stop reason: HOSPADM

## 2024-02-10 RX ORDER — INSULIN LISPRO 100 [IU]/ML
0-4 INJECTION, SOLUTION INTRAVENOUS; SUBCUTANEOUS NIGHTLY
Status: DISCONTINUED | OUTPATIENT
Start: 2024-02-10 | End: 2024-02-14 | Stop reason: HOSPADM

## 2024-02-10 RX ORDER — SODIUM CHLORIDE 0.9 % (FLUSH) 0.9 %
10 SYRINGE (ML) INJECTION PRN
Status: DISCONTINUED | OUTPATIENT
Start: 2024-02-10 | End: 2024-02-14 | Stop reason: HOSPADM

## 2024-02-10 RX ORDER — SODIUM CHLORIDE 9 MG/ML
INJECTION, SOLUTION INTRAVENOUS PRN
Status: DISCONTINUED | OUTPATIENT
Start: 2024-02-10 | End: 2024-02-14 | Stop reason: HOSPADM

## 2024-02-10 RX ORDER — CIPROFLOXACIN 500 MG/1
500 TABLET, FILM COATED ORAL 2 TIMES DAILY
Qty: 10 TABLET | Refills: 0 | Status: SHIPPED | OUTPATIENT
Start: 2024-02-10 | End: 2024-02-15

## 2024-02-10 RX ORDER — SODIUM CHLORIDE 0.9 % (FLUSH) 0.9 %
5-40 SYRINGE (ML) INJECTION EVERY 12 HOURS SCHEDULED
Status: DISCONTINUED | OUTPATIENT
Start: 2024-02-10 | End: 2024-02-14 | Stop reason: HOSPADM

## 2024-02-10 RX ORDER — ACETAMINOPHEN 325 MG/1
650 TABLET ORAL EVERY 6 HOURS PRN
Status: DISCONTINUED | OUTPATIENT
Start: 2024-02-10 | End: 2024-02-14 | Stop reason: HOSPADM

## 2024-02-10 RX ORDER — SODIUM CHLORIDE 9 MG/ML
INJECTION, SOLUTION INTRAVENOUS CONTINUOUS
Status: DISCONTINUED | OUTPATIENT
Start: 2024-02-10 | End: 2024-02-12

## 2024-02-10 RX ORDER — FAMOTIDINE 20 MG/1
20 TABLET, FILM COATED ORAL DAILY
Status: DISCONTINUED | OUTPATIENT
Start: 2024-02-10 | End: 2024-02-10

## 2024-02-10 RX ORDER — POTASSIUM CHLORIDE 7.45 MG/ML
10 INJECTION INTRAVENOUS PRN
Status: DISCONTINUED | OUTPATIENT
Start: 2024-02-10 | End: 2024-02-14 | Stop reason: HOSPADM

## 2024-02-10 RX ORDER — INSULIN LISPRO 100 [IU]/ML
0-4 INJECTION, SOLUTION INTRAVENOUS; SUBCUTANEOUS
Status: DISCONTINUED | OUTPATIENT
Start: 2024-02-11 | End: 2024-02-14 | Stop reason: HOSPADM

## 2024-02-10 RX ORDER — DEXTROSE MONOHYDRATE 100 MG/ML
INJECTION, SOLUTION INTRAVENOUS CONTINUOUS PRN
Status: DISCONTINUED | OUTPATIENT
Start: 2024-02-10 | End: 2024-02-14 | Stop reason: HOSPADM

## 2024-02-10 RX ORDER — 0.9 % SODIUM CHLORIDE 0.9 %
500 INTRAVENOUS SOLUTION INTRAVENOUS ONCE
Status: COMPLETED | OUTPATIENT
Start: 2024-02-10 | End: 2024-02-10

## 2024-02-10 RX ORDER — POTASSIUM CHLORIDE 20 MEQ/1
40 TABLET, EXTENDED RELEASE ORAL PRN
Status: DISCONTINUED | OUTPATIENT
Start: 2024-02-10 | End: 2024-02-14 | Stop reason: HOSPADM

## 2024-02-10 RX ADMIN — ONDANSETRON 4 MG: 2 INJECTION INTRAMUSCULAR; INTRAVENOUS at 14:14

## 2024-02-10 RX ADMIN — ONDANSETRON 4 MG: 2 INJECTION INTRAMUSCULAR; INTRAVENOUS at 12:47

## 2024-02-10 RX ADMIN — SODIUM CHLORIDE: 9 INJECTION, SOLUTION INTRAVENOUS at 18:20

## 2024-02-10 RX ADMIN — PROCHLORPERAZINE EDISYLATE 10 MG: 5 INJECTION INTRAMUSCULAR; INTRAVENOUS at 21:49

## 2024-02-10 RX ADMIN — ONDANSETRON 4 MG: 2 INJECTION INTRAMUSCULAR; INTRAVENOUS at 16:58

## 2024-02-10 RX ADMIN — SODIUM CHLORIDE 500 ML: 9 INJECTION, SOLUTION INTRAVENOUS at 12:46

## 2024-02-10 RX ADMIN — PANTOPRAZOLE SODIUM 40 MG: 40 INJECTION, POWDER, FOR SOLUTION INTRAVENOUS at 19:55

## 2024-02-10 ASSESSMENT — PAIN - FUNCTIONAL ASSESSMENT: PAIN_FUNCTIONAL_ASSESSMENT: NONE - DENIES PAIN

## 2024-02-10 NOTE — ED NOTES
Writer called to bedside by pt who requests \"something for this nausea\" - pt reminded that physician must evaluate her first and will be in to evaluate her as soon as he is available to do so. Pt requests RN to order the medication or to just call the physician. Reiterated to pt that physician will be in to evaluate pt as soon as he is available and writer will bring in medication as it is ordered.

## 2024-02-10 NOTE — ED PROVIDER NOTES
EMERGENCY DEPARTMENT ENCOUNTER    Pt Name: Jono Lindquist  MRN: [id number]  Birthdate 1940  Date of evaluation: 2/10/24  CHIEF COMPLAINT       Chief Complaint   Patient presents with    Nausea    Emesis     X 3-4 days. Believes she has food poisoning.     HISTORY OF PRESENT ILLNESS   The history is provided by the patient and medical records.      Patient is an 83-year-old female who presents to the ED for nausea, vomiting and diarrhea.  Symptoms started 3 days ago.  She believes some stemming from food poisoning after eating a pizza.  However, after further questioning, 2 other family members ate the same pizza and did not develop any symptoms.  No fevers, chest pain, abdominal pain.  No reports of hematemesis or bloody stools.    REVIEW OF SYSTEMS     Review of Systems  All other systems reviewed and are negative.    PASTMEDICAL HISTORY     Past Medical History:   Diagnosis Date    Anxiety     Atrial fibrillation (HCC)     Cancer (HCC)     cervical    CHF (congestive heart failure) (HCC)     COPD (chronic obstructive pulmonary disease) (HCC)     Depression (emotion)     Diabetes mellitus (HCC)     Hyperlipidemia     Hypertension     Tachycardia     Vertigo      Past Problem List  Patient Active Problem List   Diagnosis Code    COPD exacerbation ([facility]) J44.1    Diabetes mellitus with hyperglycemia ([facility]) E11.65    Hypertension I10    Hyperlipidemia E78.5    Depression F32.A    Morbid obesity ([facility]) E66.01    Arthritis of right knee M17.11    Gout attack M10.9    Arthritis of right shoulder region M19.011    Acute renal failure ([facility]) N17.9    Asthmatic bronchitis with exacerbation J45.901    Hypomagnesemia E83.42    Type 2 diabetes mellitus without complication, with long-term current use of insulin ([facility]) E11.9, Z79.4    Paroxysmal SVT (supraventricular tachycardia) I47.10    CHF (congestive heart failure) ([facility]) I50.9    Epistaxis R04.0    Acute blood loss anemia D62    ADRIANE (acute kidney injury) ([facility]) N17.9

## 2024-02-10 NOTE — ED NOTES
RN to bedside for alarming centralized monitoring for low saturation. Saturation 85% on room air. Applied supplemental oxygen at 2 lpm via NC with minimal response. Pt currently 96% on 4lpm via NC. Pt demanding ice chips despite nausea and active vomiting. RN instructed patient that she should remain NPO as she is still actively vomiting. Pt states she does not care because her chest is burning. RN offered to request medication for this from doctor. Pt states all she wants is ice chips.  Pt requested writer move out of the way so she could vomit on the floor. Pt provided with emesis basin as emesis bag provided by primary RN earlier was not being utilized properly.

## 2024-02-10 NOTE — ED NOTES
Pt assisted back to room 20 via wheelchair from bathroom. Pt able to stand with steadying assist and able to pull up pants per self. Pt ambulated 3 steps to wheelchair per self.

## 2024-02-10 NOTE — ED NOTES
ED to inpatient nurses report     Chief Complaint   Patient presents with    Nausea    Emesis     X 3-4 days. Believes she has food poisoning.      Present to ED from home - pt lives independently at home  LOC: alert and orientated to name, place, date  Vital signs   Vitals:    02/10/24 1123 02/10/24 1217 02/10/24 1230 02/10/24 1314   BP: 123/71  (!) 128/94    Pulse: 73      Resp: 18      Temp: 97.9 °F (36.6 °C)      TempSrc: Oral      SpO2: 93% 94%  100%   Weight: 88.5 kg (195 lb)      Height: 1.6 m (5' 3\")         Oxygen Baseline room air    Current needs required required 2L for a short time   SEPSIS:   [] Lactate X 2 ordered (Yes or No)  [] Antibiotics given (Yes or No)  [] IV Fluids ordered (Yes or No)             [] 2nd IV completed (Yes or No)  [] Hourly Vital Signs (Validated)  [] Outstanding Orders:     LDAs:   Peripheral IV 02/10/24 Left Forearm (Active)     Mobility: Requires assistance * 1 able to transfer to and from wheelchair with one assist  Fall Risk:    Pending ED orders: CT abd  Present condition: stable  Code Status: full code   Consults: Internal Medicine Consult  []  Hospitalist  Completed  [] yes [] no Who:   [x]  Medicine  Completed  [x] yes [] No Who:   []  Cardiology  Completed  [] yes [] No Who:   []  GI   Completed  [] yes [] No Who:   []  Neurology  Completed  [] yes [] No Who:   []  Nephrology Completed  [] yes [] No Who:    []  Vascular  Completed  [] yes [] No Who:   []  Ortho  Completed  [] yes [] No Who:     []  Surgery  Completed  [] yes [] No Who:    []  Urology  Completed  [] yes [] No Who:    []  CT Surgery Completed  [] yes [] No Who:   []  Podiatry  Completed  [] yes [] No Who:    []  Other    Completed  [] yes [] No Who:  Interventions: IV, fluids, zofran, labs  Important Events: 3-4 days of n/v/d which pt reports is from bad pizza. Pt has had dry heaves and clear emesis here with one small episode of diarrhea. Pt becomes increasingly belligerent and verbally abusive when

## 2024-02-10 NOTE — ED NOTES
Pt's son to nurses' station stating his mother wants ice chips. Writer to bedside. Pt actively vomiting clear emesis on floor - provided emesis bag. Pt demanding ice chips and when informed again that she cannot have ice chips currently, states \"this is bullshit\".

## 2024-02-10 NOTE — ED NOTES
Pt assisted back to bed, stating she needs to lay down right now. Pt states she can't go home feeling like this, that she is too weak. Asks \"how could the doctor send me home? What kind of doctor is he?\" While helping pt back into bed, pt demands her water be within reach while she is getting back into bed - pt informed that writer is unable to help her into bed as well as hold water within pt's reach and pt is unable to hold water while getting back into bed. Pt argumentative and demanding despite attempts from RN to reassure pt that we will take care of things one at a time. Pt assisted back into bed. HOB elevated for pt comfort. Emesis basin placed next to pt's R arm. Bilat side rails up for pt safety. Water placed on side table on R side of bed. Pt covered with blankets. Will inform Dr. Ritchie of pt's c/o weakness.

## 2024-02-10 NOTE — ED NOTES
Restroom call light alarming. RN to restroom. Pt sitting on toilet laying on trash can after an episode of clear emesis. Primary RN Cara to restroom as patient would not answer writer when asked if she would like to return to exam room or if she needed more time.

## 2024-02-10 NOTE — ED NOTES
Pt arrives via EMS from home with report of n/v/d x past 3-4 days. Pt believes she got food poisoning from leftover pizza. Pt c/o nausea and dry heaves - no active emesis at this time. Pt denies c/o pain.

## 2024-02-10 NOTE — ED NOTES
Pt medicated as ordered and documented.  Pt repetitively asking for and demanding ice chips. Reiterated to pt that she cannot yet have ice chips and reason why. Pt continues asking for ice chips.

## 2024-02-10 NOTE — PROGRESS NOTES
Pt admitted to room 2008 from ED in stable condition.  Oriented to room and surroundings  Bed in lowest position, wheels locked, 2/4 side rails up  Call light in reach, room free of clutter, adequate lighting provided  Denies any further questions at this time  Instructed to call out with any questions/concerns/new onset of pain and/or n/v   White board updated  Continue to monitor with hourly rounding  Bed alarm on/Fall Risk signs in place/Fall risk sticker to wrist band  Non-skid socks on/at bedside

## 2024-02-11 LAB
ALBUMIN SERPL-MCNC: 3.3 G/DL (ref 3.5–5.2)
ALP SERPL-CCNC: 132 U/L (ref 35–104)
ALT SERPL-CCNC: 9 U/L (ref 5–33)
ANION GAP SERPL CALCULATED.3IONS-SCNC: 13 MMOL/L (ref 9–17)
AST SERPL-CCNC: 21 U/L
BASOPHILS # BLD: 0.05 K/UL (ref 0–0.2)
BASOPHILS NFR BLD: 1 % (ref 0–2)
BILIRUB SERPL-MCNC: 1.2 MG/DL (ref 0.3–1.2)
BUN SERPL-MCNC: 46 MG/DL (ref 8–23)
BUN/CREAT SERPL: 31 (ref 9–20)
CALCIUM SERPL-MCNC: 8.7 MG/DL (ref 8.6–10.4)
CHLORIDE SERPL-SCNC: 100 MMOL/L (ref 98–107)
CO2 SERPL-SCNC: 27 MMOL/L (ref 20–31)
CREAT SERPL-MCNC: 1.5 MG/DL (ref 0.5–0.9)
EOSINOPHIL # BLD: 0.06 K/UL (ref 0–0.44)
EOSINOPHILS RELATIVE PERCENT: 1 % (ref 1–4)
ERYTHROCYTE [DISTWIDTH] IN BLOOD BY AUTOMATED COUNT: 18.7 % (ref 11.8–14.4)
GFR SERPL CREATININE-BSD FRML MDRD: 34 ML/MIN/1.73M2
GLUCOSE BLD-MCNC: 73 MG/DL (ref 65–105)
GLUCOSE BLD-MCNC: 74 MG/DL (ref 65–105)
GLUCOSE BLD-MCNC: 86 MG/DL (ref 65–105)
GLUCOSE BLD-MCNC: 89 MG/DL (ref 65–105)
GLUCOSE BLD-MCNC: 90 MG/DL (ref 65–105)
GLUCOSE SERPL-MCNC: 82 MG/DL (ref 70–99)
HCT VFR BLD AUTO: 36.4 % (ref 36.3–47.1)
HGB BLD-MCNC: 10.6 G/DL (ref 11.9–15.1)
IMM GRANULOCYTES # BLD AUTO: 0.02 K/UL (ref 0–0.3)
IMM GRANULOCYTES NFR BLD: 0 %
INR PPP: 1.2
LYMPHOCYTES NFR BLD: 1.37 K/UL (ref 1.1–3.7)
LYMPHOCYTES RELATIVE PERCENT: 23 % (ref 24–43)
MCH RBC QN AUTO: 27.1 PG (ref 25.2–33.5)
MCHC RBC AUTO-ENTMCNC: 29.1 G/DL (ref 28.4–34.8)
MCV RBC AUTO: 93.1 FL (ref 82.6–102.9)
MONOCYTES NFR BLD: 0.55 K/UL (ref 0.1–1.2)
MONOCYTES NFR BLD: 9 % (ref 3–12)
NEUTROPHILS NFR BLD: 66 % (ref 36–65)
NEUTS SEG NFR BLD: 3.8 K/UL (ref 1.5–8.1)
NRBC BLD-RTO: 0 PER 100 WBC
PLATELET # BLD AUTO: 171 K/UL (ref 138–453)
PMV BLD AUTO: 8.6 FL (ref 8.1–13.5)
POTASSIUM SERPL-SCNC: 4.2 MMOL/L (ref 3.7–5.3)
PROT SERPL-MCNC: 6.8 G/DL (ref 6.4–8.3)
PROTHROMBIN TIME: 14.8 SEC (ref 11.5–14.2)
RBC # BLD AUTO: 3.91 M/UL (ref 3.95–5.11)
RBC # BLD: ABNORMAL 10*6/UL
SODIUM SERPL-SCNC: 140 MMOL/L (ref 135–144)
TROPONIN I SERPL HS-MCNC: 56 NG/L (ref 0–14)
TROPONIN I SERPL HS-MCNC: 58 NG/L (ref 0–14)
TROPONIN I SERPL HS-MCNC: 59 NG/L (ref 0–14)
WBC OTHER # BLD: 5.9 K/UL (ref 3.5–11.3)

## 2024-02-11 PROCEDURE — C9113 INJ PANTOPRAZOLE SODIUM, VIA: HCPCS | Performed by: NURSE PRACTITIONER

## 2024-02-11 PROCEDURE — 85025 COMPLETE CBC W/AUTO DIFF WBC: CPT

## 2024-02-11 PROCEDURE — 84484 ASSAY OF TROPONIN QUANT: CPT

## 2024-02-11 PROCEDURE — 2580000003 HC RX 258: Performed by: REGISTERED NURSE

## 2024-02-11 PROCEDURE — 82947 ASSAY GLUCOSE BLOOD QUANT: CPT

## 2024-02-11 PROCEDURE — 97530 THERAPEUTIC ACTIVITIES: CPT

## 2024-02-11 PROCEDURE — 2580000003 HC RX 258: Performed by: NURSE PRACTITIONER

## 2024-02-11 PROCEDURE — 36415 COLL VENOUS BLD VENIPUNCTURE: CPT

## 2024-02-11 PROCEDURE — 97162 PT EVAL MOD COMPLEX 30 MIN: CPT

## 2024-02-11 PROCEDURE — 80053 COMPREHEN METABOLIC PANEL: CPT

## 2024-02-11 PROCEDURE — 6360000002 HC RX W HCPCS: Performed by: REGISTERED NURSE

## 2024-02-11 PROCEDURE — A4216 STERILE WATER/SALINE, 10 ML: HCPCS | Performed by: NURSE PRACTITIONER

## 2024-02-11 PROCEDURE — 6360000002 HC RX W HCPCS: Performed by: NURSE PRACTITIONER

## 2024-02-11 PROCEDURE — 6370000000 HC RX 637 (ALT 250 FOR IP): Performed by: HOSPITALIST

## 2024-02-11 PROCEDURE — 85610 PROTHROMBIN TIME: CPT

## 2024-02-11 PROCEDURE — 1200000000 HC SEMI PRIVATE

## 2024-02-11 RX ORDER — WARFARIN SODIUM 5 MG/1
5 TABLET ORAL DAILY
Status: DISCONTINUED | OUTPATIENT
Start: 2024-02-11 | End: 2024-02-12

## 2024-02-11 RX ORDER — HYDROCODONE BITARTRATE AND ACETAMINOPHEN 5; 325 MG/1; MG/1
1 TABLET ORAL EVERY 6 HOURS PRN
Status: DISCONTINUED | OUTPATIENT
Start: 2024-02-11 | End: 2024-02-14 | Stop reason: HOSPADM

## 2024-02-11 RX ORDER — DILTIAZEM HYDROCHLORIDE 120 MG/1
120 CAPSULE, COATED, EXTENDED RELEASE ORAL DAILY
Status: DISCONTINUED | OUTPATIENT
Start: 2024-02-11 | End: 2024-02-12

## 2024-02-11 RX ORDER — INSULIN GLARGINE 100 [IU]/ML
16 INJECTION, SOLUTION SUBCUTANEOUS NIGHTLY
Status: DISCONTINUED | OUTPATIENT
Start: 2024-02-11 | End: 2024-02-12

## 2024-02-11 RX ORDER — ALLOPURINOL 100 MG/1
100 TABLET ORAL DAILY
Status: DISCONTINUED | OUTPATIENT
Start: 2024-02-11 | End: 2024-02-14 | Stop reason: HOSPADM

## 2024-02-11 RX ORDER — VENLAFAXINE HYDROCHLORIDE 75 MG/1
225 CAPSULE, EXTENDED RELEASE ORAL DAILY
Status: DISCONTINUED | OUTPATIENT
Start: 2024-02-11 | End: 2024-02-12

## 2024-02-11 RX ORDER — LANOLIN ALCOHOL/MO/W.PET/CERES
3 CREAM (GRAM) TOPICAL NIGHTLY PRN
Status: DISCONTINUED | OUTPATIENT
Start: 2024-02-11 | End: 2024-02-14 | Stop reason: HOSPADM

## 2024-02-11 RX ORDER — ATORVASTATIN CALCIUM 40 MG/1
40 TABLET, FILM COATED ORAL DAILY
Status: DISCONTINUED | OUTPATIENT
Start: 2024-02-11 | End: 2024-02-14 | Stop reason: HOSPADM

## 2024-02-11 RX ADMIN — ALLOPURINOL 100 MG: 100 TABLET ORAL at 18:45

## 2024-02-11 RX ADMIN — ENOXAPARIN SODIUM 40 MG: 100 INJECTION SUBCUTANEOUS at 09:35

## 2024-02-11 RX ADMIN — VENLAFAXINE HYDROCHLORIDE 225 MG: 75 CAPSULE, EXTENDED RELEASE ORAL at 18:48

## 2024-02-11 RX ADMIN — ONDANSETRON 4 MG: 2 INJECTION INTRAMUSCULAR; INTRAVENOUS at 20:19

## 2024-02-11 RX ADMIN — ONDANSETRON 4 MG: 2 INJECTION INTRAMUSCULAR; INTRAVENOUS at 06:25

## 2024-02-11 RX ADMIN — PANTOPRAZOLE SODIUM 40 MG: 40 INJECTION, POWDER, FOR SOLUTION INTRAVENOUS at 09:35

## 2024-02-11 RX ADMIN — SODIUM CHLORIDE: 9 INJECTION, SOLUTION INTRAVENOUS at 20:21

## 2024-02-11 RX ADMIN — SODIUM CHLORIDE: 9 INJECTION, SOLUTION INTRAVENOUS at 06:25

## 2024-02-11 RX ADMIN — DILTIAZEM HYDROCHLORIDE 120 MG: 120 CAPSULE, COATED, EXTENDED RELEASE ORAL at 18:45

## 2024-02-11 RX ADMIN — Medication 3 MG: at 20:19

## 2024-02-11 NOTE — CARE COORDINATION
Case Management Assessment  Initial Evaluation    Date/Time of Evaluation: 2/11/2024 9:09 AM  Assessment Completed by: Vee Galvez RN    If patient is discharged prior to next notation, then this note serves as note for discharge by case management.    Patient Name: Jono Lindquist                   YOB: 1940  Diagnosis: Intractable nausea and vomiting [R11.2]  Diarrhea, unspecified type [R19.7]                   Date / Time: 2/10/2024 11:11 AM    Patient Admission Status: Inpatient   Readmission Risk (Low < 19, Mod (19-27), High > 27): Readmission Risk Score: 16.1    Current PCP: Eloy Jackson MD  PCP verified by CM? Yes    Chart Reviewed: Yes      History Provided by: Patient  Patient Orientation: Alert and Oriented    Patient Cognition: Alert    Hospitalization in the last 30 days (Readmission):  No    If yes, Readmission Assessment in  Navigator will be completed.    Advance Directives:      Code Status: Full Code   Patient's Primary Decision Maker is: Legal Next of Kin      Discharge Planning:    Patient lives with: Alone Type of Home: House  Primary Care Giver: Self  Patient Support Systems include: Children, Family Members   Current Financial resources: Medicare  Current community resources: None  Current services prior to admission: Durable Medical Equipment            Current DME: Oxygen Therapy (Comment), Cane, Walker            Type of Home Care services:  None    ADLS  Prior functional level: Cooking, Housework, Shopping  Current functional level: Other (see comment) (Await Pt/OT eval)    PT AM-PAC:   /24  OT AM-PAC:   /24    Family can provide assistance at DC: Yes  Would you like Case Management to discuss the discharge plan with any other family members/significant others, and if so, who?    Plans to Return to Present Housing: Unknown at present  Other Identified Issues/Barriers to RETURNING to current housing: weakness  Potential Assistance needed at discharge: Home Care

## 2024-02-11 NOTE — H&P
History & Physical  [facility].,    Adult Hospitalist      Name: Jono Lindquist  MRN: [id number]     Acct: [id number]  Room: 2008/2008-02    Admit Date: 2/10/2024 11:11 AM  PCP: Eloy Jackson MD    Primary Problem  Principal Problem:    Intractable nausea and vomiting  Resolved Problems:    * No resolved hospital problems. *        Assesment/ plan:       [facility]        Intractable nausea and vomiting  IV fluids  Antiemetics  Pain controlled      Elevated troponin  Trend troponin  Cardiology consult            Enteritis  CT abdomen was concerning for enteritis or renal also  PPI  Liquid diet  GI consult        Cholelithiasis  LFTs  Ultrasound abdomen  General surgery consult        Diabetes type 2  Monitor blood glucose  SSI        Paroxysmal A-fib  On Coumadin  On Cardizem  Monitor heart rate        Chronic COPD  Stable      Continue to monitor/telemetry/CBC with differential daily/BMP daily  DVT and GI prophylaxis.  Continue medications as below      Scheduled Meds:   sodium chloride flush  5-40 mL IntraVENous 2 times per day    enoxaparin  40 mg SubCUTAneous Daily    pantoprazole (PROTONIX) 40 mg in sodium chloride (PF) 0.9 % 10 mL injection  40 mg IntraVENous Daily    insulin lispro  0-4 Units SubCUTAneous TID WC    insulin lispro  0-4 Units SubCUTAneous Nightly     Continuous Infusions:   sodium chloride 75 mL/hr at 02/11/24 0625    sodium chloride      dextrose       PRN Meds:  melatonin, 3 mg, Nightly PRN  sodium chloride flush, 10 mL, PRN  sodium chloride, , PRN  potassium chloride, 40 mEq, PRN   Or  potassium alternative oral replacement, 40 mEq, PRN   Or  potassium chloride, 10 mEq, PRN  magnesium sulfate, 1,000 mg, PRN  ondansetron, 4 mg, Q8H PRN   Or  ondansetron, 4 mg, Q6H PRN  magnesium hydroxide, 30 mL, Daily PRN  acetaminophen, 650 mg, Q6H PRN   Or  acetaminophen, 650 mg, Q6H PRN  calcium carbonate, 500 mg, TID PRN  glucose, 4 tablet, PRN  dextrose bolus, 125

## 2024-02-11 NOTE — PLAN OF CARE
Problem: Safety - Adult  Goal: Free from fall injury  Outcome: Progressing  Flowsheets (Taken 2/10/2024 2017)  Free From Fall Injury:   Instruct family/caregiver on patient safety   Based on caregiver fall risk screen, instruct family/caregiver to ask for assistance with transferring infant if caregiver noted to have fall risk factors

## 2024-02-11 NOTE — PROGRESS NOTES
Physical Therapy  Facility/Department: [facility] MED SURG  Physical Therapy Initial Assessment    Name: Jono Lindquist  : 1940  MRN: [id number]  Date of Service: 2024  RN Rosana reports patient is medically stable for therapy treatment this date.    Chart reviewed prior to treatment and patient is agreeable for therapy.  All lines intact and patient positioned comfortably at end of treatment.  All patient needs addressed prior to ending therapy session.      Discharge Recommendations:  Patient would benefit from continued therapy after discharge   Pt currently functioning below baseline.  Recommend daily inpatient skilled therapy at time of discharge to maximize long term outcomes and prevent re-admission. Please refer to AM-PAC score for current level of function.    Per H&P: Patient is an 83-year-old female who presents to the ED for nausea, vomiting and diarrhea.  Symptoms started 3 days ago.  She believes some stemming from food poisoning after eating a pizza.  However, after further questioning, 2 other family members ate the same pizza and did not develop any symptoms.  No fevers, chest pain, abdominal pain.  No reports of hematemesis or bloody stools       Patient Diagnosis(es): The primary encounter diagnosis was Diarrhea, unspecified type. A diagnosis of Intractable nausea and vomiting was also pertinent to this visit.  Past Medical History:  has a past medical history of Anxiety, Atrial fibrillation (HCC), Cancer (HCC), CHF (congestive heart failure) (HCC), COPD (chronic obstructive pulmonary disease) (HCC), Depression (emotion), Diabetes mellitus (HCC), Hyperlipidemia, Hypertension, Tachycardia, and Vertigo.  Past Surgical History:  has a past surgical history that includes Hysterectomy.    Assessment   Body Structures, Functions, Activity Limitations Requiring Skilled Therapeutic Intervention: Decreased functional mobility ;Decreased ADL status;Decreased balance;Decreased endurance;Decreased safe

## 2024-02-11 NOTE — PROGRESS NOTES
Pt admitted to room 2008 from ER.  Oriented to room and call light/tv controls.  Bed in lowest position, wheels locked, 2/4 side rails up  Call light in reach, room free of clutter, adequate lighting provided.  Pt not willing to answer admission questions.  Med Rec consult entered via perfect serve messaging.  RN messaged Tawanna NP regarding patient's heartburn and unable to take pepcid po. New order received for protonix IV and tums prn as ordered.  2002 RN messaged Tawanna asking if blood glucose and insulin scales are needed since pt is a diabetic. New order received for insulin and accu checks as ordered.  2013 RN messaged Tawanna NP regardin pt's nausea and RN unable to give Zofran d/t prn time frame. New order received for compazine as ordered.  2030 Pt requesting to talk to Yogesh via phone. RN called phone number for Yogesh in computer. Number is disconnected.

## 2024-02-12 ENCOUNTER — APPOINTMENT (OUTPATIENT)
Dept: ULTRASOUND IMAGING | Age: 84
DRG: 384 | End: 2024-02-12
Payer: MEDICARE

## 2024-02-12 LAB
ALBUMIN SERPL-MCNC: 3.1 G/DL (ref 3.5–5.2)
ALP SERPL-CCNC: 119 U/L (ref 35–104)
ALT SERPL-CCNC: 8 U/L (ref 5–33)
AST SERPL-CCNC: 16 U/L
BASOPHILS # BLD: 0.05 K/UL (ref 0–0.2)
BASOPHILS NFR BLD: 1 % (ref 0–2)
BILIRUB DIRECT SERPL-MCNC: 0.4 MG/DL
BILIRUB INDIRECT SERPL-MCNC: 0.6 MG/DL (ref 0–1)
BILIRUB SERPL-MCNC: 1 MG/DL (ref 0.3–1.2)
EOSINOPHIL # BLD: 0.11 K/UL (ref 0–0.44)
EOSINOPHILS RELATIVE PERCENT: 2 % (ref 1–4)
ERYTHROCYTE [DISTWIDTH] IN BLOOD BY AUTOMATED COUNT: 18.4 % (ref 11.8–14.4)
GLUCOSE BLD-MCNC: 102 MG/DL (ref 65–105)
GLUCOSE BLD-MCNC: 146 MG/DL (ref 65–105)
GLUCOSE BLD-MCNC: 60 MG/DL (ref 65–105)
GLUCOSE BLD-MCNC: 69 MG/DL (ref 65–105)
GLUCOSE BLD-MCNC: 69 MG/DL (ref 65–105)
GLUCOSE BLD-MCNC: 74 MG/DL (ref 65–105)
GLUCOSE BLD-MCNC: 74 MG/DL (ref 65–105)
GLUCOSE BLD-MCNC: 77 MG/DL (ref 65–105)
GLUCOSE BLD-MCNC: 86 MG/DL (ref 65–105)
GLUCOSE BLD-MCNC: 98 MG/DL (ref 65–105)
HCT VFR BLD AUTO: 34.8 % (ref 36.3–47.1)
HGB BLD-MCNC: 9.7 G/DL (ref 11.9–15.1)
IMM GRANULOCYTES # BLD AUTO: 0.01 K/UL (ref 0–0.3)
IMM GRANULOCYTES NFR BLD: 0 %
INR PPP: 1.3
LYMPHOCYTES NFR BLD: 1.33 K/UL (ref 1.1–3.7)
LYMPHOCYTES RELATIVE PERCENT: 28 % (ref 24–43)
MCH RBC QN AUTO: 26.4 PG (ref 25.2–33.5)
MCHC RBC AUTO-ENTMCNC: 27.9 G/DL (ref 28.4–34.8)
MCV RBC AUTO: 94.6 FL (ref 82.6–102.9)
MONOCYTES NFR BLD: 0.48 K/UL (ref 0.1–1.2)
MONOCYTES NFR BLD: 10 % (ref 3–12)
NEUTROPHILS NFR BLD: 58 % (ref 36–65)
NEUTS SEG NFR BLD: 2.74 K/UL (ref 1.5–8.1)
NRBC BLD-RTO: 0 PER 100 WBC
PLATELET # BLD AUTO: 164 K/UL (ref 138–453)
PMV BLD AUTO: 9.2 FL (ref 8.1–13.5)
PROT SERPL-MCNC: 6.4 G/DL (ref 6.4–8.3)
PROTHROMBIN TIME: 15.5 SEC (ref 11.5–14.2)
RBC # BLD AUTO: 3.68 M/UL (ref 3.95–5.11)
RBC # BLD: ABNORMAL 10*6/UL
RBC # BLD: ABNORMAL 10*6/UL
WBC OTHER # BLD: 4.7 K/UL (ref 3.5–11.3)

## 2024-02-12 PROCEDURE — C9113 INJ PANTOPRAZOLE SODIUM, VIA: HCPCS | Performed by: NURSE PRACTITIONER

## 2024-02-12 PROCEDURE — 80076 HEPATIC FUNCTION PANEL: CPT

## 2024-02-12 PROCEDURE — 6360000002 HC RX W HCPCS: Performed by: NURSE PRACTITIONER

## 2024-02-12 PROCEDURE — A4216 STERILE WATER/SALINE, 10 ML: HCPCS | Performed by: NURSE PRACTITIONER

## 2024-02-12 PROCEDURE — 36415 COLL VENOUS BLD VENIPUNCTURE: CPT

## 2024-02-12 PROCEDURE — 97530 THERAPEUTIC ACTIVITIES: CPT

## 2024-02-12 PROCEDURE — 6370000000 HC RX 637 (ALT 250 FOR IP): Performed by: NURSE PRACTITIONER

## 2024-02-12 PROCEDURE — 85610 PROTHROMBIN TIME: CPT

## 2024-02-12 PROCEDURE — 2580000003 HC RX 258: Performed by: REGISTERED NURSE

## 2024-02-12 PROCEDURE — 85025 COMPLETE CBC W/AUTO DIFF WBC: CPT

## 2024-02-12 PROCEDURE — 2580000003 HC RX 258: Performed by: NURSE PRACTITIONER

## 2024-02-12 PROCEDURE — 1200000000 HC SEMI PRIVATE

## 2024-02-12 PROCEDURE — 97116 GAIT TRAINING THERAPY: CPT

## 2024-02-12 PROCEDURE — 6370000000 HC RX 637 (ALT 250 FOR IP): Performed by: HOSPITALIST

## 2024-02-12 PROCEDURE — 76705 ECHO EXAM OF ABDOMEN: CPT

## 2024-02-12 PROCEDURE — 93005 ELECTROCARDIOGRAM TRACING: CPT | Performed by: INTERNAL MEDICINE

## 2024-02-12 PROCEDURE — 82947 ASSAY GLUCOSE BLOOD QUANT: CPT

## 2024-02-12 PROCEDURE — 97110 THERAPEUTIC EXERCISES: CPT

## 2024-02-12 PROCEDURE — 2580000003 HC RX 258: Performed by: FAMILY MEDICINE

## 2024-02-12 RX ORDER — BUMETANIDE 2 MG/1
TABLET ORAL
COMMUNITY

## 2024-02-12 RX ORDER — DEXTROSE MONOHYDRATE 50 MG/ML
INJECTION, SOLUTION INTRAVENOUS CONTINUOUS
Status: DISCONTINUED | OUTPATIENT
Start: 2024-02-12 | End: 2024-02-14 | Stop reason: HOSPADM

## 2024-02-12 RX ORDER — DILTIAZEM HYDROCHLORIDE 120 MG/1
120 CAPSULE, COATED, EXTENDED RELEASE ORAL EVERY OTHER DAY
Status: DISCONTINUED | OUTPATIENT
Start: 2024-02-14 | End: 2024-02-14 | Stop reason: HOSPADM

## 2024-02-12 RX ORDER — HYDROXYZINE HYDROCHLORIDE 10 MG/1
10 TABLET, FILM COATED ORAL 3 TIMES DAILY PRN
COMMUNITY

## 2024-02-12 RX ORDER — VENLAFAXINE HYDROCHLORIDE 75 MG/1
150 CAPSULE, EXTENDED RELEASE ORAL DAILY
Status: DISCONTINUED | OUTPATIENT
Start: 2024-02-13 | End: 2024-02-14 | Stop reason: HOSPADM

## 2024-02-12 RX ORDER — WARFARIN SODIUM 5 MG/1
5 TABLET ORAL
Status: DISCONTINUED | OUTPATIENT
Start: 2024-02-12 | End: 2024-02-12

## 2024-02-12 RX ADMIN — SODIUM CHLORIDE, PRESERVATIVE FREE 40 MG: 5 INJECTION INTRAVENOUS at 20:41

## 2024-02-12 RX ADMIN — DEXTROSE MONOHYDRATE 125 ML: 100 INJECTION, SOLUTION INTRAVENOUS at 02:09

## 2024-02-12 RX ADMIN — Medication 3 MG: at 20:41

## 2024-02-12 RX ADMIN — Medication 16 G: at 11:21

## 2024-02-12 RX ADMIN — DEXTROSE MONOHYDRATE 125 ML: 100 INJECTION, SOLUTION INTRAVENOUS at 06:17

## 2024-02-12 RX ADMIN — DEXTROSE MONOHYDRATE 1000 ML: 50 INJECTION, SOLUTION INTRAVENOUS at 15:30

## 2024-02-12 RX ADMIN — SODIUM CHLORIDE: 9 INJECTION, SOLUTION INTRAVENOUS at 12:27

## 2024-02-12 NOTE — PROGRESS NOTES
Transitions of Care Pharmacy Service   Medication Review    The patient's list of current home medications has been reviewed.     Source(s) of information: Derek, patient's daughter Jennifer    Based on information provided by the above source(s), I have updated the patient's home med list as described below.     Please review the ACTION REQUESTED section of this note below for any discrepancies on current hospital orders.      I changed or updated the following medications on the patient's home medication list:  Removed Keflex  Cipro  Docusate  Lantus  Humalog  Xopenex  Metoprolol XL  Zofran  Spironolactone  Warfarin     Added Hydroxyzine PRN     Adjusted   Diltiazem - current dose is 120mg QOD  Magnesium - currently takes 400mg QD  Venlafaxine - current dose is 150mg QD     Other Notes Unsure of current warfarin dose - suggest to have pharmacy dose warfarin inpatient to INR 2-3         PROVIDER ACTION REQUESTED  Medications that need to be addressed by a physician/nurse practitioner:    Medication Action Requested     Diltiazem CD 120mg    Venlafaxine XR 75mg    Lantus      Warfarin     Change to 120mg QOD if appropriate    Change to 150mg QD if appropriate    Pt wasn't taking insulin prior to admission. Consider discontinuing inpatient.    Pt wasn't taking prior to admission. Consider discontinuing inpatient if appropriate.         Please feel free to call me with any questions about this encounter. Thank you.    Aundrea Espinoza RPH   Transitions of Care Pharmacy Service  Phone:  [phone]  Fax: [phone]      Electronically signed by Aundrea Espinoza RP on 2/12/2024 at 2:53 PM     Medications Prior to Admission:   bumetanide (BUMEX) 2 MG tablet, Take 1 tablet by mouth every other day alternating with 1.5 tablets every other day  sodium chloride (OCEAN, BABY AYR) 0.65 % nasal spray, 1 spray by Nasal route every 2 hours as needed (while awake)  warfarin (COUMADIN) 5 MG tablet, Take 1 tablet by mouth

## 2024-02-12 NOTE — CONSULTS
Warfarin Dosing - Pharmacy Consult Note  Consulting Provider: Harley Samuels MD  Indication:  Atrial Fibrillation  Warfarin Dose prior to admission: unknown   Concurrent anticoagulants/antiplatelets: Enoxaparin 40mg QD  Significant Drug Interactions:  allopurinol (home medication)    Recent Labs     02/10/24  1200 02/11/24  0702 02/12/24  0541   INR  --  1.2 1.3   HGB 11.9 10.6* 9.7*    171 164   LABALBU 3.8 3.3* 3.1*        Date   INR    Dose  2/12       1.3     Assessment/Plan  (Goal INR: 2 - 3)  Give warfarin 5mg tonight. INR in the morning.    Active problem list reviewed.  INR orders are placed.  Chart reviewed for pertinent labs, drug/diet interactions, and past doses.  Documentation of patient's clinical condition was reviewed.    Pharmacy Dosing:  Pharmacy will continue to follow.

## 2024-02-12 NOTE — CARE COORDINATION
Social Work-Met with patient, son, and daughter. Discussed dc options They are considering short term SNF for rehab.                    Post Acute Facility/Agency List     Provided child with the following list, the list includes the overall star ratings obtained from CMS per the Medicare Web site (www.Medicare.gov):     [] Long Term Acute Care Facilities  [] Acute Inpatient Rehabilitation Facilities  [x] Skilled Nursing Facilities  [] Home Care    Provided verbal instructions on how to utilize the QR Code to obtain additional detailed star ratings from www.Medicare.gov     offered to print and provide the detailed list:    []Accepted   [x]Declined    They are requesting short term rehab at [place]. Sent referral. Bed will be available. JHOsmar

## 2024-02-12 NOTE — ADT AUTH CERT
2/11  by Natalie Lafleur  Last Updated by Natalie Lafleur on 2/12/2024 1119     Review Status Created By   In Primary Natalie Lafleur       Review Type   --      Criteria Review   DATE: 2/11/24  M/S           PERTINENT UPDATES:  Trop elevated     VITALS:  BP (!) 118/59  Pulse 69  Temp 97.9 °F (36.6 °C) (Axillary)  Resp 16  Ht 1.6 m (5' 3\")  Wt 90.9 kg (200 lb 6.4 oz)  LMP (LMP Unknown)  SpO2 93% on RA  BMI 35.50 kg/m²      Chest - clear to auscultation, normal effort  Heart - normal rate, regular rhythm, no murmur  Abdomen - soft, nontender, nondistended, bowel sounds present all four quadrants, no masses, hepatomegaly or splenomegaly     ABNL/PERTINENT LABS/RADIOLOGY/DIAGNOSTIC STUDIES:    Latest Reference Range & Units 02/11/24 07:02 02/11/24 15:54 02/11/24 17:23 02/11/24 19:24   Sodium 135 - 144 mmol/L 140         Potassium 3.7 - 5.3 mmol/L 4.2         BUN,BUNPL 8 - 23 mg/dL 46 (H)         Creatinine 0.5 - 0.9 mg/dL 1.5 (H)         Glucose, Random 70 - 99 mg/dL 82         Troponin, High Sensitivity 0 - 14 ng/L   56 (HH) 58 (HH) 59 (HH)   WBC 3.5 - 11.3 k/uL 5.9         Hemoglobin Quant 11.9 - 15.1 g/dL 10.6 (L)         Hematocrit 36.3 - 47.1 % 36.4         Platelet Count 138 - 453 k/uL 171               MD CONSULTS/ASSESSMENT AND PLAN:  Principal Problem:    Intractable nausea and vomiting     Assesment/ plan:      Patient admitted to [facility] telemetry     Intractable nausea and vomiting  IV fluids  Antiemetics  Pain controlled     Elevated troponin  Trend troponin  Cardiology consult      Enteritis  CT abdomen was concerning for enteritis or renal also  PPI  Liquid diet  GI consult     Cholelithiasis  LFTs  Ultrasound abdomen  General surgery consult      Diabetes type 2  Monitor blood glucose  SSI         Paroxysmal A-fib  On Coumadin  On Cardizem  Monitor heart rate      Chronic COPD  Stable   Continue to monitor/telemetry/CBC with differential daily/BMP daily  DVT and GI prophylaxis.  Continue  distress  Mental status - oriented to person, place, and time with normal affect  Head - normocephalic and atraumatic  Eyes - pupils equal and reactive, extraocular eye movements intact, conjunctiva clear  Ears - hearing appears to be intact  Nose - no drainage noted  Mouth - mucous membranes moist  Neck - supple, no carotid bruits, thyroid not palpable  Chest - clear to auscultation, normal effort  Heart - normal rate, regular rhythm, no murmur  Abdomen - soft, nontender, nondistended, bowel sounds present all four quadrants, no masses, hepatomegaly or splenomegaly  Neurological - normal speech, no focal findings or movement disorder noted, cranial nerves II through XII grossly intact  Extremities - peripheral pulses palpable, no pedal edema or calf pain with palpation  Skin - no gross lesions, rashes, or induration noted     ABNL/PERTINENT LABS/RADIOLOGY/DIAGNOSTIC STUDIES:    Latest Reference Range & Units 02/10/24 12:00   Sodium 135 - 144 mmol/L 141   Potassium 3.7 - 5.3 mmol/L 4.2   BUN,BUNPL 8 - 23 mg/dL 48 (H)   Creatinine 0.5 - 0.9 mg/dL 1.4 (H)   Glucose, Random 70 - 99 mg/dL 85   WBC 3.5 - 11.3 k/uL 5.9   Hemoglobin Quant 11.9 - 15.1 g/dL 11.9   Hematocrit 36.3 - 47.1 % 40.3   Platelet Count 138 - 453 k/uL 234      CT abd/pel -   1. Mild proximal duodenal wall thickening, concerning for enteritis or less  likely duodenal ulcer.  Further evaluation is limited on this noncontrast  exam.  2. Cholelithiasis.     ED TREATMENT:  Labs, imaging  Zofran 4 mg IV x 3   mL IV x 1     ER NOTE: Patient repeat assessment: Stable.  Symptoms slightly improved with fluids and Zofran. Tolerating ice chips.  Most likely viral infection and GI.  Discussed discharge home with family however patient too weak, continues to complain of feeling nauseated.      MD CONSULTS/ASSESSMENTS & PLANS:  Principal Problem:    Intractable nausea and vomiting     Assesment/ plan:      Patient admitted to [facility] telemetry     Intractable

## 2024-02-12 NOTE — PLAN OF CARE
Problem: Discharge Planning  Goal: Discharge to home or other facility with appropriate resources  Outcome: Progressing  Flowsheets (Taken 2/12/2024 0800)  Discharge to home or other facility with appropriate resources:   Identify barriers to discharge with patient and caregiver   Arrange for needed discharge resources and transportation as appropriate   Identify discharge learning needs (meds, wound care, etc)   Refer to discharge planning if patient needs post-hospital services based on physician order or complex needs related to functional status, cognitive ability or social support system     Problem: Safety - Adult  Goal: Free from fall injury  Outcome: Progressing     Problem: ABCDS Injury Assessment  Goal: Absence of physical injury  Outcome: Progressing     Problem: Chronic Conditions and Co-morbidities  Goal: Patient's chronic conditions and co-morbidity symptoms are monitored and maintained or improved  Outcome: Progressing  Flowsheets (Taken 2/12/2024 0800)  Care Plan - Patient's Chronic Conditions and Co-Morbidity Symptoms are Monitored and Maintained or Improved:   Monitor and assess patient's chronic conditions and comorbid symptoms for stability, deterioration, or improvement   Collaborate with multidisciplinary team to address chronic and comorbid conditions and prevent exacerbation or deterioration   Update acute care plan with appropriate goals if chronic or comorbid symptoms are exacerbated and prevent overall improvement and discharge      HTN (hypertension)

## 2024-02-12 NOTE — CONSULTS
Reason for Consult: Elevated troponin  Requesting Physician: Harley Samuels MD    CHIEF COMPLAINT: Nausea and vomiting      HISTORY OF PRESENT ILLNESS:    This is an 83-year-old female with history of diabetes mellitus, essential hypertension, dyslipidemia, paroxysmal atrial fibrillation, COPD, cervical cancer, anxiety disorder, gout and GERD is admitted with complaints of abdominal discomfort associated with nausea and vomiting.    She remains n.p.o. and states that today is that she is less nauseous.    Her last EKG is from 2016 and we have ordered one to update.  It her echocardiogram is also from 2016 and will determine the need of it.      Past Medical History:    Past Medical History:   Diagnosis Date    Anxiety     Atrial fibrillation (HCC)     Cancer (HCC)     cervical    CHF (congestive heart failure) (HCC)     COPD (chronic obstructive pulmonary disease) (HCC)     Depression (emotion)     Diabetes mellitus (HCC)     Hyperlipidemia     Hypertension     Tachycardia     Vertigo      Past Surgical History:    Past Surgical History:   Procedure Laterality Date    HYSTERECTOMY (CERVIX STATUS UNKNOWN)       Home Medications:  Prior to Admission medications    Medication Sig Start Date End Date Taking? Authorizing Provider   bumetanide (BUMEX) 2 MG tablet Take 1 tablet by mouth every other day alternating with 1.5 tablets every other day   Yes Provider, MD Charity   hydrOXYzine HCl (ATARAX) 10 MG tablet Take 1 tablet by mouth 3 times daily as needed for Itching   Yes ProviderCharity MD   ciprofloxacin (CIPRO) 500 MG tablet Take 1 tablet by mouth 2 times daily for 5 days 2/10/24 2/15/24 Yes Messi Ritchie MD   ondansetron (ZOFRAN) 4 MG tablet Take 1 tablet by mouth 3 times daily as needed for Nausea or Vomiting 2/10/24  Yes Messi Ritchie MD   sodium chloride (OCEAN, BABY AYR) 0.65 % nasal spray 1 spray by Nasal route every 2 hours as needed (while awake) 10/2/17   Hina Sepulveda MD  09/08/2023 01:15 PM     BNP: No results for input(s): \"BNP\", \"PROBNP\" in the last 72 hours.  BMP:  Lab Results   Component Value Date/Time     02/11/2024 07:02 AM    K 4.2 02/11/2024 07:02 AM     02/11/2024 07:02 AM    CO2 27 02/11/2024 07:02 AM    BUN 46 02/11/2024 07:02 AM    LABALBU 3.1 02/12/2024 05:41 AM    LABALBU 3.8 12/11/2023 01:06 PM    LABALBU NEG 12/11/2023 01:06 PM    CREATININE 1.5 02/11/2024 07:02 AM    CALCIUM 8.7 02/11/2024 07:02 AM    GFRAA 42.3 12/11/2023 01:06 PM    LABGLOM 34 02/11/2024 07:02 AM    GLUCOSE 82 02/11/2024 07:02 AM    GLUCOSE 111 12/11/2023 01:06 PM     LIVER PROFILE:  Recent Labs     02/11/24  0702 02/12/24  0541   AST 21 16   ALT 9 8   LABALBU 3.3* 3.1*   ALKPHOS 132* 119*   BILITOT 1.2 1.0   BILIDIR  --  0.4*   IBILI  --  0.6   PROT 6.8 6.4     FLP:    Lab Results   Component Value Date/Time    CHOL 141 09/08/2023 01:15 PM    TRIG 93 09/08/2023 01:15 PM    HDL 47 09/08/2023 01:15 PM    LDLCHOLESTEROL 76 08/03/2016 05:07 AM     HGA1C:  No results for input(s): \"LABA1C\" in the last 720 hours.     ASSESSMENT/PLAN:  1.  Elevated troponin and nonspecific range, unlikely due to acute coronary syndrome  -Patient is on warfarin therapy    2.  Dyslipidemia, Continue atorvastatin  -Advised low-fat, low-carb diet and regular calorie burning exercises    3.  Essential hypertension  -Continue metoprolol, diltiazem, spironolactone and furosemide as needed    4.  Permanent atrial fibrillation  -Rate control with metoprolol and diltiazem  -[id number] score is high, warfarin therapy with goal INR 2-3    5. Obesity with possible obstructive sleep apnea  -Advised weight loss with low-fat low-carb diet and regular calorie burning exercises  -Recommend sleep study and use CPAP if indicated    6. Diabetes, COPD, GERD and other medical issues as per medicine      The case is discussed with nursing staff, medicine team and family at the bedside    Thank you Harley Mcintosh MD  for

## 2024-02-12 NOTE — PROGRESS NOTES
Occupational Therapy  [facility]  Occupational Therapy Not Seen Note    Patient not available for Occupational Therapy due to:    [] Testing:    [] Hemodialysis    [x] Cancelled by RN: RN asking therapy to hold d/t low BS, addressing with doctor.     []Refusal by Patient:    [] Surgery:     [] Intubation:     [] Pain Medication:    [] Sedation:     [] Spine Precautions :    [] Medical Instability:    [] Other:        Ana Freedman, OTR/L

## 2024-02-12 NOTE — CONSULTS
GASTROENTEROLOGY CONSULT       REASON FOR CONSULT:  n/v/d abd pain, abnormal CT    REQUESTING PHYSICIAN:  Harley Samuels MD    HISTORY OF PRESENT ILLNESS:    The patient is a 83 y.o. female who presents with several day history of n/v/d epigastric pain, diarrhea did resolve, but patient continued to vomit. But now tolerating clears, no fever. No other people around her have been sick. No hx of similar symptoms. Hx remote colonoscopy, no past EGD. Denies dysphagia or odynophagia. No melena or hematochezia or hematemesis.     CT a/p   IMPRESSION:  1. Mild proximal duodenal wall thickening, concerning for enteritis or less  likely duodenal ulcer.  Further evaluation is limited on this noncontrast  exam.  2. Cholelithiasis.    Patient with some hypoglycemia, started on D5% at 75cc/hr per primary.    biliT 1.0    MEDICAL HISTORY:   Past Medical History:   Diagnosis Date    Anxiety     Atrial fibrillation (HCC)     Cancer (HCC)     cervical    CHF (congestive heart failure) (HCC)     COPD (chronic obstructive pulmonary disease) (HCC)     Depression (emotion)     Diabetes mellitus (HCC)     Hyperlipidemia     Hypertension     Tachycardia     Vertigo        SURGICAL HISTORY:  Past Surgical History:   Procedure Laterality Date    HYSTERECTOMY (CERVIX STATUS UNKNOWN)         MEDS:  Prior to Admission medications    Medication Sig Start Date End Date Taking? Authorizing Provider   bumetanide (BUMEX) 2 MG tablet Take 1 tablet by mouth every other day alternating with 1.5 tablets every other day   Yes ProviderCharity MD   hydrOXYzine HCl (ATARAX) 10 MG tablet Take 1 tablet by mouth 3 times daily as needed for Itching   Yes Charity Dahl MD   ciprofloxacin (CIPRO) 500 MG tablet Take 1 tablet by mouth 2 times daily for 5 days 2/10/24 2/15/24 Yes Messi Ritchie MD   ondansetron (ZOFRAN) 4 MG tablet Take 1 tablet by mouth 3 times daily as needed for Nausea or Vomiting 2/10/24  Yes Messi Ritchie MD    sodium chloride (OCEAN, BABY AYR) 0.65 % nasal spray 1 spray by Nasal route every 2 hours as needed (while awake) 10/2/17   Hina Sepulveda MD   diltiazem (CARDIZEM CD) 120 MG extended release capsule Take 1 capsule by mouth every other day    Charity Dahl MD   venlafaxine (EFFEXOR-XR) 75 MG XR capsule Take 2 capsules by mouth daily    Eloy Jackson MD   atorvastatin (LIPITOR) 40 MG tablet Take 40 mg by mouth daily    Eloy Jackson MD   Magnesium 400 MG TABS Take 400 mg by mouth daily Indications: 2 tab BID    Charity Dahl MD   allopurinol (ZYLOPRIM) 100 MG tablet Take 1 tablet by mouth daily 11/5/15   Eloy Jackson MD   nitroGLYCERIN (NITROSTAT) 0.4 MG SL tablet Place 0.4 mg under the tongue every 5 minutes as needed for Chest pain    Charity Dahl MD   omeprazole (PRILOSEC) 20 MG capsule Take 20 mg by mouth daily.    Charity Dahl MD     Scheduled Meds:   [START ON 2/13/2024] venlafaxine  150 mg Oral Daily    [START ON 2/14/2024] dilTIAZem  120 mg Oral Every Other Day    allopurinol  100 mg Oral Daily    atorvastatin  40 mg Oral Daily    sodium chloride flush  5-40 mL IntraVENous 2 times per day    enoxaparin  40 mg SubCUTAneous Daily    pantoprazole (PROTONIX) 40 mg in sodium chloride (PF) 0.9 % 10 mL injection  40 mg IntraVENous Daily    insulin lispro  0-4 Units SubCUTAneous TID WC    insulin lispro  0-4 Units SubCUTAneous Nightly     Continuous Infusions:   dextrose 1,000 mL (02/12/24 1530)    sodium chloride      dextrose       PRN Meds:melatonin, HYDROcodone 5 mg - acetaminophen, sodium chloride flush, sodium chloride, potassium chloride **OR** potassium alternative oral replacement **OR** potassium chloride, magnesium sulfate, ondansetron **OR** ondansetron, magnesium hydroxide, acetaminophen **OR** acetaminophen, calcium carbonate, glucose, dextrose bolus **OR** dextrose bolus, glucagon (rDNA), dextrose, prochlorperazine    ALLERGIES:  Allergies   Allergen  tomorrow.   Full liquid diet, NPO at MN  BID IV PPI.     Electronically signed by JAVIER MONTES, on 2/12/2024 at 5:53 PM   Discussed with Dr. Thurman.

## 2024-02-12 NOTE — CONSULTS
Jono Lindquist is an 83 y.o.  female.  She presented to the emergency room 2 days ago with a 5-day history of nausea, vomiting and diarrhea.    History of present illness  This 83-year-old lady developed a history of nausea, vomiting and diarrhea last week after she ate some pizza.  She thinks she had developed food poisoning.  The intractable vomiting and diarrhea was associated with some crampy abdominal pain, and this has now resolved.  Since her hospitalization she has had only 1 episode of emesis and no longer complains of any diarrhea.  She denied any abdominal pain.  She was started on a full liquid diet this evening and seems to be tolerating it well.    She denied any previous attacks of right upper quadrant abdominal pain.  No history of fever, sweats or chills.  No urinary complaints.  She denied any chest pain or shortness of breath.  I noted that she was on warfarin for history of atrial fibrillation and had other comorbidities including CHF, COPD, diabetes mellitus, hypertension and hyperlipidemia.    Past Medical History:   Diagnosis Date    Anxiety     Atrial fibrillation (HCC)     Cancer (HCC)     cervical    CHF (congestive heart failure) (HCC)     COPD (chronic obstructive pulmonary disease) (HCC)     Depression (emotion)     Diabetes mellitus (HCC)     Hyperlipidemia     Hypertension     Tachycardia     Vertigo      Past surgical history  Hysterectomy    Medications  Home medications included Lantus insulin, Humalog insulin, Keflex, Bumex, Colace, warfarin, spironolactone, metoprolol, Cardizem, Effexor, Lipitor, Xopenex, magnesium oxide, allopurinol, nitroglycerin, omeprazole and normal saline nasal spray.    Allergies:   Allergies   Allergen Reactions    Lisinopril Anaphylaxis       Principal Problem:    Intractable nausea and vomiting  Resolved Problems:    * No resolved hospital problems. *    Blood pressure (!) 102/48, pulse 63, temperature 97.5 °F (36.4 °C), temperature source Oral,

## 2024-02-12 NOTE — PLAN OF CARE
Problem: Safety - Adult  Goal: Free from fall injury  Outcome: Progressing  Flowsheets (Taken 2/11/2024 1951)  Free From Fall Injury:   Instruct family/caregiver on patient safety   Based on caregiver fall risk screen, instruct family/caregiver to ask for assistance with transferring infant if caregiver noted to have fall risk factors

## 2024-02-12 NOTE — PROGRESS NOTES
Physical Therapy  Facility/Department: [facility] MED SURG  Rehabilitation Physical Therapy Treatment Note    NAME: Jono Lindquist  : 1940 (83 y.o.)  MRN: [id number]  CODE STATUS: Full Code    Date of Service: 24     Pt currently functioning below baseline.  Recommend daily inpatient skilled therapy at time of discharge to maximize long term outcomes and prevent re-admission. Please refer to AM-PAC score for current level of function.     Restrictions:  Restrictions/Precautions: General Precautions, Fall Risk  Position Activity Restriction  Other position/activity restrictions: Up w/ assist, LUE IV, O2 NC     SUBJECTIVE  Subjective  Subjective: CONSUELO De La Torre reports patient is medically stable for therapy treatment this date.    Chart reviewed prior to treatment and patient is agreeable for therapy.  All lines intact and patient positioned comfortably at end of treatment.  All patient needs addressed prior to ending therapy session.  Chair alarm in place and tested to ensure patient safety.    OBJECTIVE  Cognition  Overall Cognitive Status: Exceptions  Arousal/Alertness: Appropriate responses to stimuli  Following Commands: Follows multistep commands with increased time  Attention Span: Attends with cues to redirect  Memory: Decreased short term memory;Decreased recall of recent events  Safety Judgement: Decreased awareness of need for safety;Decreased awareness of need for assistance  Problem Solving: Decreased awareness of errors;Assistance required to identify errors made;Assistance required to correct errors made;Assistance required to implement solutions;Assistance required to generate solutions  Insights: Decreased awareness of deficits  Initiation: Requires cues for some  Sequencing: Requires cues for some  Orientation  Overall Orientation Status: Within Functional Limits    Functional Mobility  Bed Mobility  Overall Assistance Level: Contact Guard Assist;Minimal Assistance  Additional Factors: Set-up;Verbal  cues;Increased time to complete;Head of bed raised;With handrails  Roll Left  Assistance Level: Supervision  Roll Right  Assistance Level: Supervision  Skilled Clinical Factors: vc's for hand placement and progression to sidelying this date. Patient is [place] and requires repeatition for clarification and understanding  Sit to Supine  Skilled Clinical Factors: unable to assess as patient retires to recliner upon completion of PT treatment.  Supine to Sit  Assistance Level: Minimal assistance  Skilled Clinical Factors: Requires assist for support from supine to sit and into upright posture.  Scooting  Assistance Level: Stand by assist  Skilled Clinical Factors: vc's to scoot all the way out  for improved stability and support.  Balance  Sitting Balance: Supervision  Standing Balance: Minimal assistance  Standing Balance  Time: 1-2 minutes  Activity: Patient stands static in front of bed working on core control and positional acclimation performs mnii marches and lateral WS before progression to ambulation to ensure safety awarenes and challenge static balance  Transfers  Surface: To chair with arms;From bed  Additional Factors: Set-up;Verbal cues;Hand placement cues;Increased time to complete;With handrails;Mat lowered  Device: Walker  Sit to Stand  Assistance Level: Contact guard assist;Minimal assistance  Skilled Clinical Factors: vc's for pushing off of bed into stance to maximize stability and technique cues for \"nose over toes\" approach  Stand to Sit  Assistance Level: Contact guard assist  Skilled Clinical Factors: vc's for slow controled descent into seated posture to maximize eccentric quad control  Bed To/From Chair  Technique: Stand step  Assistance Level: Minimal assistance;Contact guard assist  Skilled Clinical Factors: vc's for positioning with assistive device and safety awareness      Environmental Mobility  Ambulation  Surface: Level surface  Device: Rolling walker  Distance: 25 feet x2  Activity: Within

## 2024-02-13 ENCOUNTER — ANESTHESIA (OUTPATIENT)
Dept: OPERATING ROOM | Age: 84
DRG: 384 | End: 2024-02-13
Payer: MEDICARE

## 2024-02-13 ENCOUNTER — ANESTHESIA EVENT (OUTPATIENT)
Dept: OPERATING ROOM | Age: 84
DRG: 384 | End: 2024-02-13
Payer: MEDICARE

## 2024-02-13 LAB
ALBUMIN SERPL-MCNC: 3 G/DL (ref 3.5–5.2)
ALP SERPL-CCNC: 116 U/L (ref 35–104)
ALT SERPL-CCNC: 8 U/L (ref 5–33)
AST SERPL-CCNC: 17 U/L
BASOPHILS # BLD: 0 K/UL (ref 0–0.2)
BASOPHILS NFR BLD: 0 % (ref 0–2)
BILIRUB DIRECT SERPL-MCNC: 0.4 MG/DL
BILIRUB INDIRECT SERPL-MCNC: 0.5 MG/DL (ref 0–1)
BILIRUB SERPL-MCNC: 0.9 MG/DL (ref 0.3–1.2)
EKG Q-T INTERVAL: 448 MS
EKG QRS DURATION: 104 MS
EKG QTC CALCULATION (BAZETT): 465 MS
EKG R AXIS: -48 DEGREES
EKG T AXIS: 143 DEGREES
EKG VENTRICULAR RATE: 65 BPM
EOSINOPHIL # BLD: 0.16 K/UL (ref 0–0.44)
EOSINOPHILS RELATIVE PERCENT: 3 % (ref 1–4)
ERYTHROCYTE [DISTWIDTH] IN BLOOD BY AUTOMATED COUNT: 18.1 % (ref 11.8–14.4)
GLUCOSE BLD-MCNC: 100 MG/DL (ref 65–105)
GLUCOSE BLD-MCNC: 128 MG/DL (ref 65–105)
GLUCOSE BLD-MCNC: 158 MG/DL (ref 65–105)
GLUCOSE BLD-MCNC: 79 MG/DL (ref 65–105)
GLUCOSE BLD-MCNC: 92 MG/DL (ref 65–105)
HCT VFR BLD AUTO: 34.9 % (ref 36.3–47.1)
HGB BLD-MCNC: 9.7 G/DL (ref 11.9–15.1)
IMM GRANULOCYTES # BLD AUTO: 0 K/UL (ref 0–0.3)
IMM GRANULOCYTES NFR BLD: 0 %
INR PPP: 1.2
LYMPHOCYTES NFR BLD: 1.43 K/UL (ref 1.1–3.7)
LYMPHOCYTES RELATIVE PERCENT: 27 % (ref 24–43)
MCH RBC QN AUTO: 27.2 PG (ref 25.2–33.5)
MCHC RBC AUTO-ENTMCNC: 27.8 G/DL (ref 28.4–34.8)
MCV RBC AUTO: 97.8 FL (ref 82.6–102.9)
MONOCYTES NFR BLD: 0.53 K/UL (ref 0.1–1.2)
MONOCYTES NFR BLD: 10 % (ref 3–12)
MORPHOLOGY: ABNORMAL
NEUTROPHILS NFR BLD: 60 % (ref 36–65)
NEUTS SEG NFR BLD: 3.18 K/UL (ref 1.5–8.1)
PLATELET # BLD AUTO: 188 K/UL (ref 138–453)
PMV BLD AUTO: 9.6 FL (ref 8.1–13.5)
PROT SERPL-MCNC: 6.5 G/DL (ref 6.4–8.3)
PROTHROMBIN TIME: 15.3 SEC (ref 11.5–14.2)
RBC # BLD AUTO: 3.57 M/UL (ref 3.95–5.11)
WBC OTHER # BLD: 5.3 K/UL (ref 3.5–11.3)

## 2024-02-13 PROCEDURE — 85610 PROTHROMBIN TIME: CPT

## 2024-02-13 PROCEDURE — 36415 COLL VENOUS BLD VENIPUNCTURE: CPT

## 2024-02-13 PROCEDURE — 2580000003 HC RX 258: Performed by: NURSE ANESTHETIST, CERTIFIED REGISTERED

## 2024-02-13 PROCEDURE — 6370000000 HC RX 637 (ALT 250 FOR IP): Performed by: FAMILY MEDICINE

## 2024-02-13 PROCEDURE — 3609012400 HC EGD TRANSORAL BIOPSY SINGLE/MULTIPLE: Performed by: INTERNAL MEDICINE

## 2024-02-13 PROCEDURE — 2580000003 HC RX 258: Performed by: NURSE PRACTITIONER

## 2024-02-13 PROCEDURE — 2580000003 HC RX 258: Performed by: HOSPITALIST

## 2024-02-13 PROCEDURE — 85025 COMPLETE CBC W/AUTO DIFF WBC: CPT

## 2024-02-13 PROCEDURE — 6360000002 HC RX W HCPCS: Performed by: NURSE PRACTITIONER

## 2024-02-13 PROCEDURE — 2580000003 HC RX 258: Performed by: INTERNAL MEDICINE

## 2024-02-13 PROCEDURE — 6370000000 HC RX 637 (ALT 250 FOR IP): Performed by: HOSPITALIST

## 2024-02-13 PROCEDURE — 6360000002 HC RX W HCPCS: Performed by: INTERNAL MEDICINE

## 2024-02-13 PROCEDURE — 3700000001 HC ADD 15 MINUTES (ANESTHESIA): Performed by: INTERNAL MEDICINE

## 2024-02-13 PROCEDURE — 7100000001 HC PACU RECOVERY - ADDTL 15 MIN: Performed by: INTERNAL MEDICINE

## 2024-02-13 PROCEDURE — C9113 INJ PANTOPRAZOLE SODIUM, VIA: HCPCS | Performed by: INTERNAL MEDICINE

## 2024-02-13 PROCEDURE — 88305 TISSUE EXAM BY PATHOLOGIST: CPT

## 2024-02-13 PROCEDURE — 1200000000 HC SEMI PRIVATE

## 2024-02-13 PROCEDURE — 2500000003 HC RX 250 WO HCPCS: Performed by: NURSE ANESTHETIST, CERTIFIED REGISTERED

## 2024-02-13 PROCEDURE — 7100000000 HC PACU RECOVERY - FIRST 15 MIN: Performed by: INTERNAL MEDICINE

## 2024-02-13 PROCEDURE — 6360000002 HC RX W HCPCS: Performed by: NURSE ANESTHETIST, CERTIFIED REGISTERED

## 2024-02-13 PROCEDURE — 80076 HEPATIC FUNCTION PANEL: CPT

## 2024-02-13 PROCEDURE — 94761 N-INVAS EAR/PLS OXIMETRY MLT: CPT

## 2024-02-13 PROCEDURE — 82947 ASSAY GLUCOSE BLOOD QUANT: CPT

## 2024-02-13 PROCEDURE — 2709999900 HC NON-CHARGEABLE SUPPLY: Performed by: INTERNAL MEDICINE

## 2024-02-13 PROCEDURE — 3700000000 HC ANESTHESIA ATTENDED CARE: Performed by: INTERNAL MEDICINE

## 2024-02-13 PROCEDURE — C9113 INJ PANTOPRAZOLE SODIUM, VIA: HCPCS | Performed by: NURSE PRACTITIONER

## 2024-02-13 PROCEDURE — 6370000000 HC RX 637 (ALT 250 FOR IP): Performed by: INTERNAL MEDICINE

## 2024-02-13 PROCEDURE — 2700000000 HC OXYGEN THERAPY PER DAY

## 2024-02-13 PROCEDURE — 97166 OT EVAL MOD COMPLEX 45 MIN: CPT

## 2024-02-13 PROCEDURE — A4216 STERILE WATER/SALINE, 10 ML: HCPCS | Performed by: NURSE PRACTITIONER

## 2024-02-13 PROCEDURE — 0DB68ZX EXCISION OF STOMACH, VIA NATURAL OR ARTIFICIAL OPENING ENDOSCOPIC, DIAGNOSTIC: ICD-10-PCS | Performed by: INTERNAL MEDICINE

## 2024-02-13 PROCEDURE — 0DB58ZX EXCISION OF ESOPHAGUS, VIA NATURAL OR ARTIFICIAL OPENING ENDOSCOPIC, DIAGNOSTIC: ICD-10-PCS | Performed by: INTERNAL MEDICINE

## 2024-02-13 PROCEDURE — 97535 SELF CARE MNGMENT TRAINING: CPT

## 2024-02-13 RX ORDER — SODIUM CHLORIDE, SODIUM LACTATE, POTASSIUM CHLORIDE, CALCIUM CHLORIDE 600; 310; 30; 20 MG/100ML; MG/100ML; MG/100ML; MG/100ML
INJECTION, SOLUTION INTRAVENOUS CONTINUOUS PRN
Status: DISCONTINUED | OUTPATIENT
Start: 2024-02-13 | End: 2024-02-13 | Stop reason: SDUPTHER

## 2024-02-13 RX ORDER — OMEPRAZOLE 40 MG/1
40 CAPSULE, DELAYED RELEASE ORAL 2 TIMES DAILY
Qty: 60 CAPSULE | Refills: 1 | Status: SHIPPED | OUTPATIENT
Start: 2024-02-13

## 2024-02-13 RX ORDER — PROPOFOL 10 MG/ML
INJECTION, EMULSION INTRAVENOUS PRN
Status: DISCONTINUED | OUTPATIENT
Start: 2024-02-13 | End: 2024-02-13 | Stop reason: SDUPTHER

## 2024-02-13 RX ORDER — LIDOCAINE HYDROCHLORIDE 20 MG/ML
INJECTION, SOLUTION EPIDURAL; INFILTRATION; INTRACAUDAL; PERINEURAL PRN
Status: DISCONTINUED | OUTPATIENT
Start: 2024-02-13 | End: 2024-02-13 | Stop reason: SDUPTHER

## 2024-02-13 RX ADMIN — ALLOPURINOL 100 MG: 100 TABLET ORAL at 09:45

## 2024-02-13 RX ADMIN — Medication 3 MG: at 20:24

## 2024-02-13 RX ADMIN — DEXTROSE MONOHYDRATE: 50 INJECTION, SOLUTION INTRAVENOUS at 20:23

## 2024-02-13 RX ADMIN — PROPOFOL 40 MG: 10 INJECTION, EMULSION INTRAVENOUS at 12:26

## 2024-02-13 RX ADMIN — VENLAFAXINE HYDROCHLORIDE 150 MG: 75 CAPSULE, EXTENDED RELEASE ORAL at 09:44

## 2024-02-13 RX ADMIN — SODIUM CHLORIDE, POTASSIUM CHLORIDE, SODIUM LACTATE AND CALCIUM CHLORIDE: 600; 310; 30; 20 INJECTION, SOLUTION INTRAVENOUS at 12:19

## 2024-02-13 RX ADMIN — ATORVASTATIN CALCIUM 40 MG: 40 TABLET, FILM COATED ORAL at 09:44

## 2024-02-13 RX ADMIN — PROPOFOL 30 MG: 10 INJECTION, EMULSION INTRAVENOUS at 12:30

## 2024-02-13 RX ADMIN — PROPOFOL 10 MG: 10 INJECTION, EMULSION INTRAVENOUS at 12:34

## 2024-02-13 RX ADMIN — SODIUM CHLORIDE, PRESERVATIVE FREE 40 MG: 5 INJECTION INTRAVENOUS at 09:50

## 2024-02-13 RX ADMIN — LIDOCAINE HYDROCHLORIDE 50 MG: 20 INJECTION, SOLUTION EPIDURAL; INFILTRATION; INTRACAUDAL; PERINEURAL at 12:26

## 2024-02-13 RX ADMIN — DEXTROSE MONOHYDRATE: 50 INJECTION, SOLUTION INTRAVENOUS at 05:50

## 2024-02-13 RX ADMIN — SODIUM CHLORIDE, PRESERVATIVE FREE 40 MG: 5 INJECTION INTRAVENOUS at 20:24

## 2024-02-13 ASSESSMENT — PAIN - FUNCTIONAL ASSESSMENT: PAIN_FUNCTIONAL_ASSESSMENT: NONE - DENIES PAIN

## 2024-02-13 NOTE — PROGRESS NOTES
Letter sent SPIRITUAL CARE DEPARTMENT [facility]  PROGRESS NOTE    Room # 2008/2008-02   Name: Jono Lindquist              Reason for visit: Routine    I visited the patient.    Admit Date & Time: 2/10/2024 11:11 AM    Assessment:  Jono Lindquist is a 83 y.o. female.   Andres  + Kike present. PT: and son Ariel present : Upon entering the room patient states about their medical condition, states struggles with their medical situation. States worries, fears frustrations. Patient states well , treated well. Patient states good family support, shares about spiritual life, Gnosticist background, shares Gnosticist beliefs. Patient shares about outside interests. PT: appeared a bit confused.     Intervention:   provided a ministry presence, listening and prayer.    Outcome:  Patient open to visit.     Plan:  Chaplains will remain available to offer spiritual and emotional support as needed.    Electronically signed by Chaplain Rebeca, on 2/13/2024 at 2:46 PM.  Spiritual Care Department  [facility]      02/13/24 1444   Encounter Summary   Service Provided For: Patient and family together   Referral/Consult From: [facility] [place]   Support System Children   Last Encounter  02/13/24   Complexity of Encounter Moderate   Begin Time 0135   End Time  0145   Total Time Calculated 10 min   Assessment/Intervention/Outcome   Assessment Calm;Coping   Intervention Active listening;Discussed illness injury and it’s impact;Discussed belief system/Gnosticist practices/marci;Prayer (assurance of)/[place];Sustaining Presence/Ministry of presence   Outcome Engaged in conversation;Expressed feelings, needs, and concerns;Expressed Gratitude;Receptive

## 2024-02-13 NOTE — OP NOTE
EGD NOTE      Patient:   Jono Lindquist    :    1940    Facility:   [facility]  Referring/PCP: Eloy Jackson MD    Procedure:   Esophagogastroduodenoscopy --diagnostic  Date:     2024   Endoscopist:  Jorge Alberto Thurman D.O.  Assistant:                  None    Preoperative Diagnosis:     Abdominal pain  Nausea and vomiting  Abnormal CT-duodenitis    Postoperative Diagnosis:    Esophagitis  Gastric polyps  Duodenal ulcers    Anesthesia:  MAC    Complications: None    Description of Procedure:  Informed consent was obtained after explanation of the procedure including indications, description of the procedure,  benefits and possible risks and complications of the procedure, and alternatives. Questions were answered.  The patient's history was reviewed and a directed physical examination was performed prior to the procedure.    Patient was monitored throughout the procedure with pulse oximetry and periodic assessment of vital signs. Patient was sedated as noted above. With the patient in the left lateral decubitus position, the Olympus videoendoscope was placed in the patient's mouth and under direct visualization passed into the esophagus.  Visualization of the esophagus, stomach, and duodenum was performed during both introduction and withdrawal of the endoscope and retroflexed view of the proximal stomach was obtained. The scope was passed to the 2nd portion of the duodenum.  The patient tolerated the procedure well and was taken to the recovery area in good condition.    EBL: none  Specimen:  cardia, gastric, gastric polyps  Implants: None      Findings::   Esophagus: Severe ulcerative esophagitis at GE jxn.  Irregular appearing mucosa in cardia, biopsied.   Stomach: Medium sized antral polyp and prepyloric polyp with irregular appearing mucosa.  Both biopsied.  Gastric biopsies also taken.  Duodenum: Multiple small clean based duodenal bulb ulcers (around 9).  Single small ulcer in 2nd

## 2024-02-13 NOTE — PLAN OF CARE
Problem: Safety - Adult  Goal: Free from fall injury  2/13/2024 0204 by Niecy Godinez, RN  Outcome: Progressing  Flowsheets (Taken 2/13/2024 0204)  Free From Fall Injury:   Instruct family/caregiver on patient safety   Based on caregiver fall risk screen, instruct family/caregiver to ask for assistance with transferring infant if caregiver noted to have fall risk factors

## 2024-02-13 NOTE — PROGRESS NOTES
Section of Cardiology  Progress Note      Date:  2/13/2024  Patient: Jono Lindquist  Admission:  2/10/2024 11:11 AM  Admit DX: Intractable nausea and vomiting [R11.2]  Diarrhea, unspecified type [R19.7]  Age:  83 y.o., 1940     LOS: 3 days     Reason for evaluation:   Atrial fibrillation, elevated troponin      SUBJECTIVE:     The patient was seen and examined. Notes and labs reviewed.    Patient underwent endoscopy which revealed esophagitis, gastric polyps and duodenal ulcers, and she is placed on PPI regimen    OBJECTIVE:      EXAM:   Vitals:    VITALS:  BP (!) 97/45   Pulse 65   Temp 97.7 °F (36.5 °C) (Temporal)   Resp 18   Ht 1.6 m (5' 3\")   Wt 89.6 kg (197 lb 8 oz)   LMP  (LMP Unknown)   SpO2 96%   BMI 34.99 kg/m²    24HR INTAKE/OUTPUT:    Intake/Output Summary (Last 24 hours) at 2/13/2024 1718  Last data filed at 2/13/2024 1247  Gross per 24 hour   Intake 1403.42 ml   Output --   Net 1403.42 ml         Physical exam:  Constitutional:       Appearance: Normal appearance.   HENT:      Head: Normocephalic and atraumatic.      Nose: Nose normal.      Mouth/Throat:      Mouth: Mucous membranes are moist.   Eyes:      Extraocular Movements: Extraocular movements intact.      Conjunctiva/sclera: Conjunctivae normal.      Pupils: Pupils are equal, round, and reactive to light.   Cardiovascular:      Rate and Rhythm: Normal rate. Rhythm irregular.      Pulses: Normal pulses.      Heart sounds: Murmur heard.      Comments: S1, S2, 2/6 systolic murmur  Pulmonary:      Effort: Pulmonary effort is normal.      Breath sounds: Normal breath sounds.   Abdominal:      General: Abdomen is flat. Bowel sounds are normal.      Palpations: Abdomen is soft.   Musculoskeletal:         General: Normal range of motion.   Skin:     General: Skin is warm and dry.   Neurological:      General: No focal deficit present.      Mental Status: She is alert and oriented to person, place, and time. Mental status is at  baseline.   Psychiatric:         Mood and Affect: Mood normal.         Behavior: Behavior normal.     Current Inpatient Medications:   venlafaxine  150 mg Oral Daily    [START ON 2/14/2024] dilTIAZem  120 mg Oral Every Other Day    pantoprazole (PROTONIX) 40 mg in sodium chloride (PF) 0.9 % 10 mL injection  40 mg IntraVENous Q12H    allopurinol  100 mg Oral Daily    atorvastatin  40 mg Oral Daily    sodium chloride flush  5-40 mL IntraVENous 2 times per day    enoxaparin  40 mg SubCUTAneous Daily    insulin lispro  0-4 Units SubCUTAneous TID WC    insulin lispro  0-4 Units SubCUTAneous Nightly       IV Infusions (if any):   dextrose 75 mL/hr at 02/13/24 1222    sodium chloride      dextrose         Diagnostics:   Telemetry: Atrial fibrillation    ECG  Atrial fibrillation, left anterior fascicular block, nonspecific T wave changes, poor R wave progression     Echocardiogram August 17, 2016  The left ventricle is mildly dilated. Mild concentric LVH/increased wall thickness is present. Normal left ventricular systolic function is noted. Estimated left ventricular ejection fraction is 50-55%.   The right ventricle is normal size. The right ventricular systolic function is normal.   The left atrium is mildly enlarged.   Aortic valve calcification is moderate. The aortic valve is trileaflet. Mild valvular aortic stenosis. Mild aortic regurgitation.   There is mild mitral annular calcification. Mitral leaflets appear thickened. There is moderate mitral regurgitation. Multilple MR jets.   The tricuspid valve is normal. There is trace to mild tricuspid regurgitation. Right ventricular systolic pressure is normal.     Labs:   CBC:  Recent Labs     02/12/24  0541 02/13/24  0545   WBC 4.7 5.3   HGB 9.7* 9.7*   HCT 34.8* 34.9*    188     Magnesium:No results for input(s): \"MG\" in the last 72 hours.  BMP:  Recent Labs     02/11/24  0702      K 4.2   CALCIUM 8.7   CO2 27   BUN 46*   CREATININE 1.5*   LABGLOM 34*    GLUCOSE 82     BNP:No results for input(s): \"BNP\", \"PROBNP\" in the last 72 hours.  PT/INR:  Recent Labs     02/12/24  0541 02/13/24  0545   PROTIME 15.5* 15.3*   INR 1.3 1.2     APTT:No results for input(s): \"APTT\" in the last 72 hours.  CARDIAC ENZYMES:  Recent Labs     02/11/24  1554 02/11/24  1723 02/11/24  1924   TROPHS 56* 58* 59*     FASTING LIPID PANEL:  Lab Results   Component Value Date/Time    HDL 47 09/08/2023 01:15 PM    LDLCALC 75 09/08/2023 01:15 PM    TRIG 93 09/08/2023 01:15 PM     LIVER PROFILE:  Recent Labs     02/12/24  0541 02/13/24  0545   AST 16 17   ALT 8 8   LABALBU 3.1* 3.0*   ALKPHOS 119* 116*   BILITOT 1.0 0.9   BILIDIR 0.4* 0.4*   IBILI 0.6 0.5   PROT 6.4 6.5     TSH:  No results for input(s): \"TSH\" in the last 720 hours.   HGA1C:  No results for input(s): \"LABA1C\" in the last 720 hours.     ASSESSMENT/PLAN:  1.  Elevated troponin in nonspecific range, unlikely due to acute coronary syndrome  -Patient is on warfarin therapy     2.  Dyslipidemia, Continue atorvastatin  -Advised low-fat, low-carb diet and regular calorie burning exercises     3.  Essential hypertension  -metoprolol, diltiazem, spironolactone are on hold due to low blood pressure     4.  Permanent atrial fibrillation  -metoprolol and diltiazem are on hold due to low blood pressure  -[id number] score is high, warfarin therapy with goal INR 2-3     5. Obesity with possible obstructive sleep apnea  -Advised weight loss with low-fat low-carb diet and regular calorie burning exercises  -Recommend sleep study and use CPAP if indicated     6. Diabetes, COPD, CKD, esophagitis, duodenal ulcer, GERD and other medical issues as per medicine  -Please clarify with GI team when warfarin can be resumed?        The case is discussed with nursing staff, medicine team and family at the bedside    SUE HELTON MD

## 2024-02-13 NOTE — PROGRESS NOTES
History & Physical  [facility].,    Adult Hospitalist      Name: Jono Lindquist  MRN: [id number]     Acct: [id number]  Room: 2008/2008-02    Admit Date: 2/10/2024 11:11 AM  PCP: Eloy Jackson MD    Primary Problem  Principal Problem:    Intractable nausea and vomiting  Resolved Problems:    * No resolved hospital problems. *        Assesment/ plan:       [facility]        Intractable nausea and vomiting  IV fluids  Antiemetics  Pain controlled  Full liquid diet  GI consult  Plan EGD 2/13  N.p.o. at midnight  PPI IV twice daily      Elevated troponin  Trend troponin  Cardiology consult      Enteritis  CT abdomen was concerning for enteritis or renal also  PPI  Liquid diet  GI consult      Cholelithiasis  LFTs  Ultrasound abdomen  General surgery consult      Diabetes type 2  Monitor blood glucose  SSI      Permanent A-fib  On Coumadin  On Cardizem  Monitor heart rate  Metoprolol  Chads 2 VASc score high  Cardiology recommend warfarin therapy with goal INR 2-3      Chronic COPD  Stable      Dyslipidemia  Atorvastatin  Diet control      Hypertensive heart disease  Metoprolol, diltiazem, spironolactone, furosemide      Obesity with obstructive sleep apnea  Sleep study as outpatient        Continue to monitor/telemetry/CBC with differential daily/BMP daily  DVT and GI prophylaxis.  Continue medications as below      Scheduled Meds:   [START ON 2/13/2024] venlafaxine  150 mg Oral Daily    [START ON 2/14/2024] dilTIAZem  120 mg Oral Every Other Day    pantoprazole (PROTONIX) 40 mg in sodium chloride (PF) 0.9 % 10 mL injection  40 mg IntraVENous Q12H    allopurinol  100 mg Oral Daily    atorvastatin  40 mg Oral Daily    sodium chloride flush  5-40 mL IntraVENous 2 times per day    enoxaparin  40 mg SubCUTAneous Daily    insulin lispro  0-4 Units SubCUTAneous TID     insulin lispro  0-4 Units SubCUTAneous Nightly     Continuous Infusions:   dextrose 1,000 mL (02/12/24 1530)    sodium        Chemistry:  Recent Labs     02/10/24  1200 02/11/24  0702 02/11/24  1554 02/11/24  1723 02/11/24  1924    140  --   --   --    K 4.2 4.2  --   --   --    CL 96* 100  --   --   --    CO2 29 27  --   --   --    GLUCOSE 85 82  --   --   --    BUN 48* 46*  --   --   --    CREATININE 1.4* 1.5*  --   --   --    ANIONGAP 16 13  --   --   --    LABGLOM 37* 34*  --   --   --    CALCIUM 9.6 8.7  --   --   --    TROPHS  --   --  56* 58* 59*       Recent Labs     02/10/24  1200 02/10/24  2014 02/11/24  0702 02/11/24  1136 02/12/24  0541 02/12/24  0614 02/12/24  0807 02/12/24  1115 02/12/24  1222 02/12/24  1302 02/12/24  1623 02/12/24 2025   PROT 8.1  --  6.8  --  6.4  --   --   --   --   --   --   --    LABALBU 3.8  --  3.3*  --  3.1*  --   --   --   --   --   --   --    AST 28  --  21  --  16  --   --   --   --   --   --   --    ALT 12  --  9  --  8  --   --   --   --   --   --   --    ALKPHOS 162*  --  132*  --  119*  --   --   --   --   --   --   --    BILITOT 1.4*  --  1.2  --  1.0  --   --   --   --   --   --   --    BILIDIR  --   --   --   --  0.4*  --   --   --   --   --   --   --    LIPASE 34  --   --   --   --   --   --   --   --   --   --   --    POCGLU  --    < >  --    < >  --    < > 74 60* 77 86 74 146*    < > = values in this interval not displayed.         Lab Results   Component Value Date    INR 1.3 02/12/2024    INR 1.2 02/11/2024    INR 1.1 10/02/2017    PROTIME 15.5 (H) 02/12/2024    PROTIME 14.8 (H) 02/11/2024    PROTIME 11.5 10/02/2017       Lab Results   Component Value Date/Time    SPECIAL NOT REPORTED 08/02/2016 10:09 AM     Lab Results   Component Value Date/Time    CULTURE NO GROWTH 6 DAYS 08/02/2016 10:09 AM    CULTURE  08/02/2016 10:09 AM     Performed at [address] [address] [phone]       Lab Results   Component Value Date/Time    POCPH 7.42 09/30/2017 10:15 AM    PHART 7.360 03/29/2015 03:18 AM    POCPCO2 36 09/30/2017 10:15 AM    CKU0XLJ 51.0

## 2024-02-13 NOTE — PROGRESS NOTES
Jono Lindquist is a 83 y.o. female patient.  She underwent an EGD today by the GI service, which revealed duodenal ulcers as well as ulcerative esophagitis at the GE junction.  He has been placed on PPI twice daily and a regular diet.    Current Facility-Administered Medications   Medication Dose Route Frequency Provider Last Rate Last Admin    venlafaxine (EFFEXOR XR) extended release capsule 150 mg  150 mg Oral Daily Jorge Alberto Thurman MD   150 mg at 02/13/24 0944    [START ON 2/14/2024] dilTIAZem (CARDIZEM CD) extended release capsule 120 mg  120 mg Oral Every Other Day Jorge Alberto Thurman MD        dextrose 5 % solution   IntraVENous Continuous Jorge Alberto Thurman MD 75 mL/hr at 02/13/24 1222 Restarted at 02/13/24 1247    pantoprazole (PROTONIX) 40 mg in sodium chloride (PF) 0.9 % 10 mL injection  40 mg IntraVENous Q12H Jorge Alberto Thurman MD   40 mg at 02/13/24 0950    melatonin tablet 3 mg  3 mg Oral Nightly PRN Jorge Alberto Thurman MD   3 mg at 02/12/24 2041    allopurinol (ZYLOPRIM) tablet 100 mg  100 mg Oral Daily Jorge Alberto Thurman MD   100 mg at 02/13/24 0945    atorvastatin (LIPITOR) tablet 40 mg  40 mg Oral Daily Jorge Alberto Thurman MD   40 mg at 02/13/24 0944    HYDROcodone-acetaminophen (NORCO) 5-325 MG per tablet 1 tablet  1 tablet Oral Q6H PRN Jorge Alberto Thurman MD        sodium chloride flush 0.9 % injection 5-40 mL  5-40 mL IntraVENous 2 times per day Jorge Alberto Thurman MD        sodium chloride flush 0.9 % injection 10 mL  10 mL IntraVENous PRN Jorge Alberto Thurman MD        0.9 % sodium chloride infusion   IntraVENous PRN Jorge Alberto Thurman MD        potassium chloride (KLOR-CON M) extended release tablet 40 mEq  40 mEq Oral PRN Jorge Alberto Thurman MD        Or    potassium bicarb-citric acid (EFFER-K) effervescent tablet 40 mEq  40 mEq Oral PRN Jorge Alberto Thurman MD        Or    potassium chloride 10 mEq/100 mL IVPB (Peripheral Line)  10 mEq IntraVENous PRN Jorge Alberto Thurman MD        magnesium sulfate 1000 mg in dextrose 5% 100

## 2024-02-13 NOTE — PLAN OF CARE
Problem: Discharge Planning  Goal: Discharge to home or other facility with appropriate resources  Outcome: Progressing     Problem: Safety - Adult  Goal: Free from fall injury  2/13/2024 0745 by Britt Saunders, RN  Outcome: Progressing    Flowsheets (Taken 2/13/2024 0204)  Free From Fall Injury:   Instruct family/caregiver on patient safety   Based on caregiver fall risk screen, instruct family/caregiver to ask for assistance with transferring infant if caregiver noted to have fall risk factors     Problem: ABCDS Injury Assessment  Goal: Absence of physical injury  Outcome: Progressing     Problem: Chronic Conditions and Co-morbidities  Goal: Patient's chronic conditions and co-morbidity symptoms are monitored and maintained or improved  Outcome: Progressing

## 2024-02-13 NOTE — PROGRESS NOTES
Pt returned to room from PACU, family at bedside  Oriented to room and call light/tv controls.  Bed in lowest position, wheels locked, 2/4 side rails up  Call light in reach, room free of clutter, adequate lighting provided.

## 2024-02-13 NOTE — CARE COORDINATION
Social Work-Several attempts to complete precert. [employer] would not accept her info.  also attempted [employer]. Pt could not be found on either insurance site. Will verify insurance in the morning with Saroj Byrne

## 2024-02-13 NOTE — PROGRESS NOTES
[facility].,    Adult Hospitalist      Name: Jono Lindquist  MRN: [id number]     Acct: [id number]  Room: 2008/2008-02    Admit Date: 2/10/2024 11:11 AM  PCP: Eloy Jackson MD    Primary Problem  Principal Problem:    Intractable nausea and vomiting  Resolved Problems:    * No resolved hospital problems. *        Assesment/ plan:       [facility]        Intractable nausea and vomiting  IV fluids  Antiemetics  Pain controlled  Full liquid diet  GI consult  Plan EGD 2/13- Duodenal ulcer   N.p.o. at midnight  PPI IV twice daily- change to PO  SNF- await precert      Elevated troponin  Trend troponin  Cardiology consult      Enteritis  CT abdomen was concerning for enteritis or renal also  PPI  Liquid diet  GI consult      Cholelithiasis  LFTs  Ultrasound abdomen  General surgery consult      Diabetes type 2  Monitor blood glucose  SSI      Permanent A-fib  On Coumadin  On Cardizem  Monitor heart rate  Metoprolol  Chads 2 VASc score high  Cardiology recommend warfarin therapy with goal INR 2-3      Chronic COPD  Stable      Dyslipidemia  Atorvastatin  Diet control      Hypertensive heart disease  Metoprolol, diltiazem, spironolactone, furosemide      Obesity with obstructive sleep apnea  Sleep study as outpatient      DC planning  RN at bedside   Continue to monitor/telemetry/CBC with differential daily/BMP daily  DVT and GI prophylaxis.  Continue medications as below      Scheduled Meds:   venlafaxine  150 mg Oral Daily    [START ON 2/14/2024] dilTIAZem  120 mg Oral Every Other Day    pantoprazole (PROTONIX) 40 mg in sodium chloride (PF) 0.9 % 10 mL injection  40 mg IntraVENous Q12H    allopurinol  100 mg Oral Daily    atorvastatin  40 mg Oral Daily    sodium chloride flush  5-40 mL IntraVENous 2 times per day    enoxaparin  40 mg SubCUTAneous Daily    insulin lispro  0-4 Units SubCUTAneous TID     insulin lispro  0-4 Units SubCUTAneous Nightly     Continuous Infusions:    Take 20 mg by mouth daily.    Provider, MD Charity        Allergies:       Lisinopril    Social History:     Tobacco:    reports that she quit smoking about 54 years ago. Her smoking use included cigarettes. She does not have any smokeless tobacco history on file.  Alcohol:      reports no history of alcohol use.  Drug Use:  reports no history of drug use.    Family History:     Family History   Problem Relation Age of Onset    Other Mother     Heart Disease Father          Physical Exam:     Vitals:  BP (!) 106/50   Pulse 63   Temp 97.7 °F (36.5 °C) (Temporal)   Resp 16   Ht 1.6 m (5' 3\")   Wt 89.6 kg (197 lb 8 oz)   LMP  (LMP Unknown)   SpO2 98%   BMI 34.99 kg/m²   Temp (24hrs), Av.6 °F (36.4 °C), Min:97.1 °F (36.2 °C), Max:98.1 °F (36.7 °C)          General appearance - alert, well appearing, and in no acute distress  Mental status - oriented to person, place, and time with normal affect  Head - normocephalic and atraumatic  Eyes - pupils equal and reactive, extraocular eye movements intact, conjunctiva clear  Ears - hearing appears to be intact  Nose - no drainage noted  Mouth - mucous membranes moist  Neck - supple, no carotid bruits, thyroid not palpable  Chest - clear to auscultation, normal effort  Heart - normal rate, regular rhythm, no murmur  Abdomen - soft, nontender, nondistended, bowel sounds present all four quadrants, no masses, hepatomegaly or splenomegaly  Neurological - normal speech, no focal findings or movement disorder noted, cranial nerves II through XII grossly intact  Extremities - peripheral pulses palpable, no pedal edema or calf pain with palpation  Skin - no gross lesions, rashes, or induration noted        Data:     Labs:    Hematology:  Recent Labs     24  0702 24  0541 24  0545   WBC 5.9 4.7 5.3   RBC 3.91* 3.68* 3.57*   HGB 10.6* 9.7* 9.7*   HCT 36.4 34.8* 34.9*   MCV 93.1 94.6 97.8   MCH 27.1 26.4 27.2   MCHC 29.1 27.9* 27.8*   RDW 18.7* 18.4*  18.1*    164 188   MPV 8.6 9.2 9.6   INR 1.2 1.3 1.2       Chemistry:  Recent Labs     02/11/24  0702 02/11/24  1554 02/11/24  1723 02/11/24  1924     --   --   --    K 4.2  --   --   --      --   --   --    CO2 27  --   --   --    GLUCOSE 82  --   --   --    BUN 46*  --   --   --    CREATININE 1.5*  --   --   --    ANIONGAP 13  --   --   --    LABGLOM 34*  --   --   --    CALCIUM 8.7  --   --   --    TROPHS  --  56* 58* 59*       Recent Labs     02/11/24  0702 02/11/24  1136 02/12/24  0541 02/12/24  0614 02/12/24  1302 02/12/24  1623 02/12/24  2025 02/13/24  0545 02/13/24  0633 02/13/24  1141 02/13/24  1255   PROT 6.8  --  6.4  --   --   --   --  6.5  --   --   --    LABALBU 3.3*  --  3.1*  --   --   --   --  3.0*  --   --   --    AST 21  --  16  --   --   --   --  17  --   --   --    ALT 9  --  8  --   --   --   --  8  --   --   --    ALKPHOS 132*  --  119*  --   --   --   --  116*  --   --   --    BILITOT 1.2  --  1.0  --   --   --   --  0.9  --   --   --    BILIDIR  --   --  0.4*  --   --   --   --  0.4*  --   --   --    POCGLU  --    < >  --    < > 86 74 146*  --  100 92 79    < > = values in this interval not displayed.         Lab Results   Component Value Date    INR 1.2 02/13/2024    INR 1.3 02/12/2024    INR 1.2 02/11/2024    PROTIME 15.3 (H) 02/13/2024    PROTIME 15.5 (H) 02/12/2024    PROTIME 14.8 (H) 02/11/2024       Lab Results   Component Value Date/Time    SPECIAL NOT REPORTED 08/02/2016 10:09 AM     Lab Results   Component Value Date/Time    CULTURE NO GROWTH 6 DAYS 08/02/2016 10:09 AM    CULTURE  08/02/2016 10:09 AM     Performed at [place] [place] [address] [phone]       Lab Results   Component Value Date/Time    POCPH 7.42 09/30/2017 10:15 AM    PHART 7.360 03/29/2015 03:18 AM    POCPCO2 36 09/30/2017 10:15 AM    [id number] 51.0 03/29/2015 03:18 AM    POCPO2 56 09/30/2017 10:15 AM    PO2ART 85.0 03/29/2015 03:18 AM    POCHCO3 23.6 09/30/2017 10:15

## 2024-02-13 NOTE — PROGRESS NOTES
Occupational Therapy  Facility/Department: [facility] MED SURG  Occupational Therapy Initial Assessment    Name: Jono Lindquist  : 1940  MRN: [id number]  Date of Service: 2024    Discharge Recommendations:  Patient would benefit from continued therapy after discharge   Pt currently functioning below baseline.  Recommend daily inpatient skilled therapy at time of discharge to maximize long term outcomes and prevent re-admission. Please refer to AM-PAC score for current level of function.      RN reports patient is medically stable for therapy treatment this date.    Chart reviewed prior to treatment and patient is agreeable for therapy.  All lines intact and patient positioned comfortably at end of treatment.  All patient needs addressed prior to ending therapy session.       Patient Diagnosis(es): The primary encounter diagnosis was Diarrhea, unspecified type. A diagnosis of Intractable nausea and vomiting was also pertinent to this visit.  Past Medical History:  has a past medical history of Anxiety, Atrial fibrillation (HCC), Cancer (HCC), CHF (congestive heart failure) (HCC), COPD (chronic obstructive pulmonary disease) (HCC), Depression (emotion), Diabetes mellitus (HCC), Hyperlipidemia, Hypertension, Tachycardia, and Vertigo.  Past Surgical History:  has a past surgical history that includes Hysterectomy.           Assessment   Performance deficits / Impairments: Decreased functional mobility ;Decreased ADL status;Decreased strength;Decreased safe awareness;Decreased cognition;Decreased endurance;Decreased balance;Decreased posture  Assessment: Skilled OT is indicated to increase overall safety awareness in function as well as strength,  balance, ADL status, functional mobility, and cognition to improve functional outcomes, I, and return to home when appropriate.   Prognosis: Good  Decision Making: Medium Complexity  REQUIRES OT FOLLOW-UP: Yes  Activity Tolerance  Activity Tolerance: Patient Tolerated  treatment well;Patient limited by fatigue  Activity Tolerance Comments: pt fatigues easily with dec insight into deficits.        Plan   Occupational Therapy Plan  Times Per Week: 4-5x/week, 1-2x/day  Current Treatment Recommendations: Strengthening, Balance training, Functional mobility training, Self-Care / ADL, Safety education & training, Endurance training, Patient/Caregiver education & training, Equipment evaluation, education, & procurement, Cognitive/Perceptual training, Positioning     Restrictions  Restrictions/Precautions  Restrictions/Precautions: General Precautions, Fall Risk  Required Braces or Orthoses?: No  Position Activity Restriction  Other position/activity restrictions: Up w/ assist, LUE IV, O2 NC    Subjective   General  Chart Reviewed: Yes  Patient assessed for rehabilitation services?: Yes  Additional Pertinent Hx: COPD,  CA, CHF, DM, HTN  Family / Caregiver Present: No  Subjective  Subjective: \"I want a wash cloth! I need to clean up!\"     Social/Functional History  Social/Functional History  Lives With: Alone  Type of Home: House  Home Layout: One level  Home Access: Stairs to enter without rails  Entrance Stairs - Number of Steps: 4  Bathroom Shower/Tub:  (walk-in tub)  Bathroom Toilet: Standard  Bathroom Equipment: Built-in shower seat, Grab bars in shower  Bathroom Accessibility: Accessible  Home Equipment: Cane, Oxygen, Alert Button, Rollator  Has the patient had two or more falls in the past year or any fall with injury in the past year?: No  Receives Help From: Family (reports family is local and supportive)  ADL Assistance: Independent  Homemaking Assistance: Needs assistance (hired help for cleaning)  Homemaking Responsibilities: Yes  Ambulation Assistance: Independent (no AD at baseline;  \"I only use a cane when I go visit my 's grave because [place]'s [place].\")  Transfer Assistance: Independent  Active : No  Patient's  Info: family  Occupation: Retired  Type of  Occupation: LPN  Leisure & Hobbies: \"I don't do much. I'm kind of a slug.\"; watches tv       Objective      Observation/Palpation  Posture: Fair (flexed posture. Cues to maintain upright posture)  Observation: Pt lying in bed, very focused on getting up and getting cleaned up. Assisted pt with sponge bathing and dressing  Edema: LLE - pt states \"I've had this one big leg since I gave birth to my daughter.\"  Safety Devices  Type of Devices: Call light within reach;Gait belt;Nurse notified;Left in chair;Chair alarm in place  Restraints  Restraints Initially in Place: No     Toilet Transfers  Toilet - Technique: Ambulating  Equipment Used: Standard toilet  Toilet Transfer: Minimal assistance;Contact guard assistance    AROM: Within functional limits  Strength: Within functional limits (B UEs 4- to 4/5)  Coordination: Within functional limits  Tone: Normal  Sensation: Intact    ADL  Feeding: Independent  Grooming: Contact guard assistance  Grooming Skilled Clinical Factors: pt able to stand at sink for grooming~5 min. Pt needing cues for posture and for pacing/need to walk walk to chair to rest.  UE Bathing: Minimal assistance  LE Bathing: Minimal assistance;Moderate assistance  LE Bathing Skilled Clinical Factors: pt needing assist for below knee ADLs, CGA/Mary for above knee  UE Dressing: Minimal assistance  LE Dressing: Moderate assistance  LE Dressing Skilled Clinical Factors: CGA/min A with above knee dressing and mod-max for reaching feet (unable to clarence gripper socks)  Toileting: Minimal assistance;Contact guard assistance    Functional Mobility: Minimal assistance;Contact guard assistance  Functional Mobility Skilled Clinical Factors: pt completed functional mob bed<>bathroom and then to recliner with RW. Pt needing cues/assist to stay close to device and for posture. Pt needing assist for O2 cord and IV with pt having dec awareness of safety with obstacles.  Skin Care: Foam skin cleanser        Bed

## 2024-02-13 NOTE — PROGRESS NOTES
Physical Therapy  DATE: 2024    NAME: Jono Lindquist  MRN: [id number]   : 1940    Patient not seen this date for Physical Therapy due to:      [] Cancel by RN or physician due to:    [] Hemodialysis    [] Critical Lab Value Level     [] Blood transfusion in progress    [] Acute or unstable cardiovascular status   _MAP < 55 or more than >115  _HR < 40 or > 130    [] Acute or unstable pulmonary status   -FiO2 > 60%   _RR < 5 or >40    _O2 sats < 85%    [] Strict Bedrest    [x] Off Unit for surgery or procedure EGD    [] Off Unit for testing       [] Pending imaging to R/O fracture    [] Refusal by Patient      [] Other      [] PT being discontinued at this time. Patient independent. No further needs.     [] PT being discontinued at this time as the patient has been transferred to hospice care. No further needs.      Natalie Raymundo, PTA

## 2024-02-13 NOTE — ANESTHESIA PRE PROCEDURE
Department of Anesthesiology  Preprocedure Note       Name:  Jono Lindquist   Age:  83 y.o.  :  1940                                          MRN:  [id number]         Date:  2024      Surgeon: Surgeon(s):  Jorge Alberto Thurman MD    Procedure: Procedure(s):  EGD ESOPHAGOGASTRODUODENOSCOPY    Medications prior to admission:   Prior to Admission medications    Medication Sig Start Date End Date Taking? Authorizing Provider   bumetanide (BUMEX) 2 MG tablet Take 1 tablet by mouth every other day alternating with 1.5 tablets every other day   Yes Charity Dahl MD   hydrOXYzine HCl (ATARAX) 10 MG tablet Take 1 tablet by mouth 3 times daily as needed for Itching   Yes Charity Dahl MD   ciprofloxacin (CIPRO) 500 MG tablet Take 1 tablet by mouth 2 times daily for 5 days 2/10/24 2/15/24 Yes Messi Ritchie MD   ondansetron (ZOFRAN) 4 MG tablet Take 1 tablet by mouth 3 times daily as needed for Nausea or Vomiting 2/10/24  Yes Messi Ritchie MD   sodium chloride (OCEAN, BABY AYR) 0.65 % nasal spray 1 spray by Nasal route every 2 hours as needed (while awake) 10/2/17   Hina Sepulveda MD   diltiazem (CARDIZEM CD) 120 MG extended release capsule Take 1 capsule by mouth every other day    Charity Dahl MD   venlafaxine (EFFEXOR-XR) 75 MG XR capsule Take 2 capsules by mouth daily    Eloy Jackson MD   atorvastatin (LIPITOR) 40 MG tablet Take 40 mg by mouth daily    Eloy Jackson MD   Magnesium 400 MG TABS Take 400 mg by mouth daily Indications: 2 tab BID    Charity Dahl MD   allopurinol (ZYLOPRIM) 100 MG tablet Take 1 tablet by mouth daily 11/5/15   Eloy Jackson MD   nitroGLYCERIN (NITROSTAT) 0.4 MG SL tablet Place 0.4 mg under the tongue every 5 minutes as needed for Chest pain    Charity Dahl MD   omeprazole (PRILOSEC) 20 MG capsule Take 20 mg by mouth daily.    Charity Dahl MD       Current medications:    Current Facility-Administered Medications   Medication

## 2024-02-13 NOTE — PROGRESS NOTES
Physician Progress Note      PATIENT:               ZACARIAS ORTEGA  CSN #:                  [id number]  :                       1940  ADMIT DATE:       2/10/2024 11:11 AM  DISCH DATE:  RESPONDING  PROVIDER #:        Harley Samuels MD          QUERY TEXT:    Patient admitted with Enteritis noted to have paroxysmal atrial fibrillation   on Coumadin as per H&P on . If possible, please document in progress   notes and discharge summary if you are evaluating and/or treating any of the   following:    The medical record reflects the following:  Risk Factors: Age 83/F, HTN, DM, CHF  Clinical Indicators:  CH&P noted- \" paroxysmal atrial fibrillation on   Coumadin .  PN by Chencho Obrien MD:  2-12 Chads 2 VASc score high  Cardiology recommend warfarin therapy with goal INR 2-3.  Cardio PN: Permanent   atrial fibrillation  -Rate control with metoprolol and diltiazem  -[id number] score is high, warfarin therapy with goal INR 2-3  Treatment: Coumadin management, bleeding precautions    Thank you. Ambreen Jamison RN, Clinical Documentation Integrity, [employer], [facility], CRCR  Options provided:  -- Secondary hypercoagulable state in a patient with atrial fibrillation: This   patient has secondary hypercoagulable state in a patient with atrial   fibrillation.  -- Other - I will add my own diagnosis  -- Disagree - Not applicable / Not valid  -- Disagree - Clinically unable to determine / Unknown  -- Refer to Clinical Documentation Reviewer    PROVIDER RESPONSE TEXT:    Secondary hypercoagulable state in a patient with atrial fibrillation: This   patient has secondary hypercoagulable state in a patient with atrial   fibrillation.    Query created by: Ambreen Jamison on 2024 12:20 PM      Electronically signed by:  Harley Samuels MD 2024 12:49 PM

## 2024-02-14 VITALS
DIASTOLIC BLOOD PRESSURE: 46 MMHG | OXYGEN SATURATION: 92 % | HEIGHT: 63 IN | TEMPERATURE: 97.2 F | HEART RATE: 69 BPM | SYSTOLIC BLOOD PRESSURE: 94 MMHG | BODY MASS INDEX: 35.44 KG/M2 | WEIGHT: 200 LBS | RESPIRATION RATE: 16 BRPM

## 2024-02-14 LAB
ALBUMIN SERPL-MCNC: 3.1 G/DL (ref 3.5–5.2)
ALP SERPL-CCNC: 115 U/L (ref 35–104)
ALT SERPL-CCNC: 7 U/L (ref 5–33)
AST SERPL-CCNC: 16 U/L
BASOPHILS # BLD: <0.03 K/UL (ref 0–0.2)
BASOPHILS NFR BLD: 0 % (ref 0–2)
BILIRUB DIRECT SERPL-MCNC: 0.4 MG/DL
BILIRUB INDIRECT SERPL-MCNC: 0.6 MG/DL (ref 0–1)
BILIRUB SERPL-MCNC: 1 MG/DL (ref 0.3–1.2)
EOSINOPHIL # BLD: 0.18 K/UL (ref 0–0.44)
EOSINOPHILS RELATIVE PERCENT: 4 % (ref 1–4)
ERYTHROCYTE [DISTWIDTH] IN BLOOD BY AUTOMATED COUNT: 17.9 % (ref 11.8–14.4)
GLUCOSE BLD-MCNC: 104 MG/DL (ref 65–105)
GLUCOSE BLD-MCNC: 125 MG/DL (ref 65–105)
HCT VFR BLD AUTO: 34.2 % (ref 36.3–47.1)
HGB BLD-MCNC: 9.7 G/DL (ref 11.9–15.1)
IMM GRANULOCYTES # BLD AUTO: 0.01 K/UL (ref 0–0.3)
IMM GRANULOCYTES NFR BLD: 0 %
INR PPP: 1.3
LYMPHOCYTES NFR BLD: 1.15 K/UL (ref 1.1–3.7)
LYMPHOCYTES RELATIVE PERCENT: 23 % (ref 24–43)
MCH RBC QN AUTO: 27.2 PG (ref 25.2–33.5)
MCHC RBC AUTO-ENTMCNC: 28.4 G/DL (ref 28.4–34.8)
MCV RBC AUTO: 95.8 FL (ref 82.6–102.9)
MONOCYTES NFR BLD: 0.5 K/UL (ref 0.1–1.2)
MONOCYTES NFR BLD: 10 % (ref 3–12)
NEUTROPHILS NFR BLD: 64 % (ref 36–65)
NEUTS SEG NFR BLD: 3.23 K/UL (ref 1.5–8.1)
PLATELET # BLD AUTO: 166 K/UL (ref 138–453)
PMV BLD AUTO: 9.5 FL (ref 8.1–13.5)
PROT SERPL-MCNC: 6.3 G/DL (ref 6.4–8.3)
PROTHROMBIN TIME: 16.3 SEC (ref 11.5–14.2)
RBC # BLD AUTO: 3.57 M/UL (ref 3.95–5.11)
RBC # BLD: ABNORMAL 10*6/UL
WBC OTHER # BLD: 5.1 K/UL (ref 3.5–11.3)

## 2024-02-14 PROCEDURE — 94761 N-INVAS EAR/PLS OXIMETRY MLT: CPT

## 2024-02-14 PROCEDURE — 6370000000 HC RX 637 (ALT 250 FOR IP): Performed by: INTERNAL MEDICINE

## 2024-02-14 PROCEDURE — 6360000002 HC RX W HCPCS: Performed by: INTERNAL MEDICINE

## 2024-02-14 PROCEDURE — 85025 COMPLETE CBC W/AUTO DIFF WBC: CPT

## 2024-02-14 PROCEDURE — 6370000000 HC RX 637 (ALT 250 FOR IP): Performed by: FAMILY MEDICINE

## 2024-02-14 PROCEDURE — 2500000003 HC RX 250 WO HCPCS: Performed by: FAMILY MEDICINE

## 2024-02-14 PROCEDURE — C9113 INJ PANTOPRAZOLE SODIUM, VIA: HCPCS | Performed by: INTERNAL MEDICINE

## 2024-02-14 PROCEDURE — 2580000003 HC RX 258: Performed by: INTERNAL MEDICINE

## 2024-02-14 PROCEDURE — 80076 HEPATIC FUNCTION PANEL: CPT

## 2024-02-14 PROCEDURE — A4216 STERILE WATER/SALINE, 10 ML: HCPCS | Performed by: INTERNAL MEDICINE

## 2024-02-14 PROCEDURE — 85610 PROTHROMBIN TIME: CPT

## 2024-02-14 PROCEDURE — 36415 COLL VENOUS BLD VENIPUNCTURE: CPT

## 2024-02-14 PROCEDURE — 82947 ASSAY GLUCOSE BLOOD QUANT: CPT

## 2024-02-14 RX ORDER — WARFARIN SODIUM 5 MG/1
5 TABLET ORAL DAILY
Status: DISCONTINUED | OUTPATIENT
Start: 2024-02-14 | End: 2024-02-14

## 2024-02-14 RX ORDER — SENNOSIDES A AND B 8.6 MG/1
1 TABLET, FILM COATED ORAL 2 TIMES DAILY PRN
Status: DISCONTINUED | OUTPATIENT
Start: 2024-02-14 | End: 2024-02-14 | Stop reason: HOSPADM

## 2024-02-14 RX ORDER — WARFARIN SODIUM 5 MG/1
TABLET ORAL
Qty: 30 TABLET | Refills: 0 | Status: SHIPPED | OUTPATIENT
Start: 2024-02-14

## 2024-02-14 RX ORDER — DOCUSATE SODIUM 100 MG/1
100 CAPSULE, LIQUID FILLED ORAL 2 TIMES DAILY
Status: DISCONTINUED | OUTPATIENT
Start: 2024-02-14 | End: 2024-02-14 | Stop reason: HOSPADM

## 2024-02-14 RX ORDER — SENNOSIDES A AND B 8.6 MG/1
1 TABLET, FILM COATED ORAL 2 TIMES DAILY PRN
Qty: 60 TABLET | Refills: 0 | Status: SHIPPED | OUTPATIENT
Start: 2024-02-14 | End: 2024-03-15

## 2024-02-14 RX ORDER — PSEUDOEPHEDRINE HCL 30 MG
100 TABLET ORAL 2 TIMES DAILY
Qty: 60 CAPSULE | Refills: 0 | Status: SHIPPED | OUTPATIENT
Start: 2024-02-14

## 2024-02-14 RX ADMIN — SODIUM CHLORIDE, PRESERVATIVE FREE 40 MG: 5 INJECTION INTRAVENOUS at 09:03

## 2024-02-14 RX ADMIN — ENOXAPARIN SODIUM 40 MG: 100 INJECTION SUBCUTANEOUS at 09:03

## 2024-02-14 RX ADMIN — DILTIAZEM HYDROCHLORIDE 120 MG: 120 CAPSULE, COATED, EXTENDED RELEASE ORAL at 09:03

## 2024-02-14 RX ADMIN — DEXTROSE MONOHYDRATE: 50 INJECTION, SOLUTION INTRAVENOUS at 10:27

## 2024-02-14 RX ADMIN — VENLAFAXINE HYDROCHLORIDE 150 MG: 75 CAPSULE, EXTENDED RELEASE ORAL at 09:03

## 2024-02-14 RX ADMIN — DOCUSATE SODIUM 100 MG: 100 CAPSULE, LIQUID FILLED ORAL at 10:00

## 2024-02-14 RX ADMIN — SENNOSIDES 8.6 MG: 8.6 TABLET, FILM COATED ORAL at 10:00

## 2024-02-14 RX ADMIN — ANTI-FUNGAL POWDER MICONAZOLE NITRATE TALC FREE: 1.42 POWDER TOPICAL at 10:00

## 2024-02-14 RX ADMIN — ATORVASTATIN CALCIUM 40 MG: 40 TABLET, FILM COATED ORAL at 09:03

## 2024-02-14 RX ADMIN — ALLOPURINOL 100 MG: 100 TABLET ORAL at 09:03

## 2024-02-14 NOTE — PROGRESS NOTES
Ginny here to transfer patient to [place]. Patient packet handed to . Family at bedside. Patient is leaving with all personal belongings in stable condition. Patient and family verbalized understanding of follow up appointments and transfer. Patient stable at time of discharge.

## 2024-02-14 NOTE — PROGRESS NOTES
[facility]    Adult Hospitalist      Name: Jono Lindquist  MRN: [id number]     Acct: [id number]  Room: 2008/2008-02    Admit Date: 2/10/2024 11:11 AM  PCP: Eloy Jackson MD    Primary Problem  Principal Problem:    Intractable nausea and vomiting  Resolved Problems:    * No resolved hospital problems. *        Assesment/ plan:       [facility]        Intractable nausea and vomiting  IV fluids  Antiemetics  Pain controlled  Full liquid diet  GI consult  Plan EGD 2/13- Duodenal ulcer   N.p.o. at midnight  PPI IV twice daily- change to PO  SNF- await precert      Elevated troponin  Trend troponin  Cardiology consult      Enteritis  CT abdomen was concerning for enteritis or renal also  PPI  Liquid diet  GI consult      Cholelithiasis  LFTs  Ultrasound abdomen  General surgery consult      Diabetes type 2  Monitor blood glucose  SSI      Permanent A-fib  On Coumadin  On Cardizem  Monitor heart rate  Metoprolol  Chads 2 VASc score high  Cardiology recommend warfarin therapy with goal INR 2-3      Chronic COPD  Stable      Dyslipidemia  Atorvastatin  Diet control      Hypertensive heart disease  Metoprolol, diltiazem, spironolactone, furosemide      Obesity with obstructive sleep apnea  Sleep study as outpatient      DC planning  DC time greater than 35-minute  RN at bedside   Continue to monitor/telemetry/CBC with differential daily/BMP daily  DVT and GI prophylaxis.  Continue medications as below      Scheduled Meds:   warfarin  5 mg Oral Daily    miconazole   Topical BID    docusate sodium  100 mg Oral BID    venlafaxine  150 mg Oral Daily    dilTIAZem  120 mg Oral Every Other Day    pantoprazole (PROTONIX) 40 mg in sodium chloride (PF) 0.9 % 10 mL injection  40 mg IntraVENous Q12H    allopurinol  100 mg Oral Daily    atorvastatin  40 mg Oral Daily    sodium chloride flush  5-40 mL IntraVENous 2 times per day    enoxaparin  40 mg SubCUTAneous Daily    insulin lispro  0-4 Units  SubCUTAneous TID WC    insulin lispro  0-4 Units SubCUTAneous Nightly     Continuous Infusions:   dextrose 75 mL/hr at 02/14/24 0621    sodium chloride      dextrose       PRN Meds:  senna, 1 tablet, BID PRN  melatonin, 3 mg, Nightly PRN  HYDROcodone 5 mg - acetaminophen, 1 tablet, Q6H PRN  sodium chloride flush, 10 mL, PRN  sodium chloride, , PRN  potassium chloride, 40 mEq, PRN   Or  potassium alternative oral replacement, 40 mEq, PRN   Or  potassium chloride, 10 mEq, PRN  magnesium sulfate, 1,000 mg, PRN  ondansetron, 4 mg, Q8H PRN   Or  ondansetron, 4 mg, Q6H PRN  magnesium hydroxide, 30 mL, Daily PRN  acetaminophen, 650 mg, Q6H PRN   Or  acetaminophen, 650 mg, Q6H PRN  calcium carbonate, 500 mg, TID PRN  glucose, 4 tablet, PRN  dextrose bolus, 125 mL, PRN   Or  dextrose bolus, 250 mL, PRN  glucagon (rDNA), 1 mg, PRN  dextrose, , Continuous PRN  prochlorperazine, 10 mg, Q6H PRN        Chief Complaint:     Chief Complaint   Patient presents with    Nausea    Emesis     X 3-4 days. Believes she has food poisoning.         History of Present Illness:        Patient seen and examined at bedside  Last 24-hour events reviewed with nursing    Denies chest pain, dyspnea orthopnea  Denies headache, fever, vision change  Denies neck pain, back pain  Denies dysuria        Initial HPI    Jono Lindquist is a 83 y.o.  female who presents with Nausea and Emesis (X 3-4 days. Believes she has food poisoning.)    83-year-old presented to intermittent abdominal pain and diarrhea.  Clinical history.  Stated that he was in pain but none started having symptoms of depression with PRBC poisoning.  H&P done.  Patient denies any chest pain, shortness breath, fever, chills, changes urination or rash.    Patient admitted for further management          I have personally reviewed the past medical history, past surgical history, medications, social history, and family history, and summarized in the note.    Review of Systems:     All 10

## 2024-02-14 NOTE — PLAN OF CARE
Problem: Discharge Planning  Goal: Discharge to home or other facility with appropriate resources  2/14/2024 1404 by Susy Burnette RN  Outcome: Adequate for Discharge  2/14/2024 1216 by Susy Burnette RN  Outcome: Progressing  Flowsheets (Taken 2/14/2024 0900)  Discharge to home or other facility with appropriate resources: Identify barriers to discharge with patient and caregiver  2/14/2024 0531 by Aleksandra Mason RN  Outcome: Progressing     Problem: Safety - Adult  Goal: Free from fall injury  2/14/2024 1404 by Susy Burnette RN  Outcome: Adequate for Discharge  2/14/2024 1216 by Susy Burnette RN  Outcome: Progressing  Flowsheets (Taken 2/14/2024 1216)  Free From Fall Injury:   Based on caregiver fall risk screen, instruct family/caregiver to ask for assistance with transferring infant if caregiver noted to have fall risk factors   Instruct family/caregiver on patient safety  2/14/2024 0531 by Aleksandra Mason RN  Outcome: Progressing     Problem: ABCDS Injury Assessment  Goal: Absence of physical injury  2/14/2024 1404 by Susy Burnette RN  Outcome: Adequate for Discharge  2/14/2024 1216 by Susy Burnette RN  Outcome: Progressing  Flowsheets (Taken 2/14/2024 1216)  Absence of Physical Injury: Implement safety measures based on patient assessment  2/14/2024 0531 by Aleksandra Mason RN  Outcome: Progressing     Problem: Chronic Conditions and Co-morbidities  Goal: Patient's chronic conditions and co-morbidity symptoms are monitored and maintained or improved  2/14/2024 1404 by Susy Burnette RN  Outcome: Adequate for Discharge  2/14/2024 1216 by Susy Burnette RN  Outcome: Progressing  Flowsheets (Taken 2/14/2024 0900)  Care Plan - Patient's Chronic Conditions and Co-Morbidity Symptoms are Monitored and Maintained or Improved: Monitor and assess patient's chronic conditions and comorbid symptoms for stability, deterioration, or improvement  2/14/2024 0531 by Aleksandra Mason RN  Outcome:  RN  Outcome: Progressing

## 2024-02-14 NOTE — PLAN OF CARE
Patient expected to discharge to [place] this shift.   Problem: Discharge Planning  Goal: Discharge to home or other facility with appropriate resources  2/14/2024 1216 by Susy Burnette RN  Outcome: Progressing  Flowsheets (Taken 2/14/2024 0900)  Discharge to home or other facility with appropriate resources: Identify barriers to discharge with patient and caregiver     Problem: Safety - Adult  Goal: Free from fall injury  2/14/2024 1216 by Susy Burnette RN  Outcome: Progressing  Flowsheets (Taken 2/14/2024 1216)  Free From Fall Injury:   Based on caregiver fall risk screen, instruct family/caregiver to ask for assistance with transferring infant if caregiver noted to have fall risk factors   Instruct family/caregiver on patient safety     Problem: ABCDS Injury Assessment  Goal: Absence of physical injury  2/14/2024 1216 by Susy Burnette RN  Outcome: Progressing  Flowsheets (Taken 2/14/2024 1216)  Absence of Physical Injury: Implement safety measures based on patient assessment

## 2024-02-14 NOTE — CARE COORDINATION
[facility] will admit today. [facility] will transport at 230. Orders faxed. Nurse to call report [phone]. Pt and family are agreeable with dc plans. [place]

## 2024-02-15 NOTE — DISCHARGE SUMMARY
DISCHARGE SUMMARY  [facility].,    Adult Hospitalist      Patient ID: Jono Lindquist  MRN: [id number]     Acct:  [id number]       Patient's PCP: Eloy Jackson MD    Admit Date: 2/10/2024     Discharge Date: 2/14/2024      Admitting Physician: Harley Samuels MD    Discharge Physician: Chencho Obrien MD     CONSULTANTS: Patient Care Team:  Eloy Jackson MD as PCP - General    PROCEDURES PERFORMED:     Active Discharge Diagnoses:    Intractable nausea and vomiting  IV fluids  Antiemetics  Pain controlled  Full liquid diet  GI consult  Plan EGD 2/13- Duodenal ulcer   N.p.o. at midnight  PPI IV twice daily- change to PO  SNF- await precert        Elevated troponin  Trend troponin  Cardiology consult        Enteritis  CT abdomen was concerning for enteritis or renal also  PPI  Liquid diet  GI consult        Cholelithiasis  LFTs  Ultrasound abdomen  General surgery consult        Diabetes type 2  Monitor blood glucose  SSI        Permanent A-fib  On Coumadin  On Cardizem  Monitor heart rate  Metoprolol  Chads 2 VASc score high  Cardiology recommend warfarin therapy with goal INR 2-3        Chronic COPD  Stable        Dyslipidemia  Atorvastatin  Diet control        Hypertensive heart disease  Metoprolol, diltiazem, spironolactone, furosemide        Obesity with obstructive sleep apnea  Sleep study as outpatient      Primary Problem  Intractable nausea and vomiting    Hospital Course: Patient admitted with intractable nausea and vomiting.  Given IV fluids initially and antiemetics as needed.  Pain controlled and once better full liquid diet advanced.  GI were consulted who performed an EGD on 2/13/2024.  Patient was found to have a duodenal ulcer.  Recommended proton pump inhibitor twice a day.  Patient had become very weak and qualified for skilled nursing facility short stay    Patient has permanent atrial fibrillation.  Cardiology were consulted who recommended warfarin therapy with a goal of INR 2-3  supple, no carotid bruits, thyroid not palpable  Chest - clear to auscultation, normal effort  Heart - normal rate, regular rhythm, no murmur  Abdomen - soft, nontender, nondistended, bowel sounds present all four quadrants, no masses, hepatomegaly or splenomegaly  Neurological - normal speech, no focal findings or movement disorder noted, cranial nerves II through XII grossly intact  Extremities - peripheral pulses palpable, no pedal edema or calf pain with palpation  Skin - no gross lesions, rashes, or induration noted      Consults:  IP CONSULT TO INTERNAL MEDICINE  IP CONSULT TO SOCIAL WORK  IP CONSULT TO CARDIOLOGY  IP CONSULT TO GI  IP CONSULT TO GENERAL SURGERY    Disposition: SNF    Discharged Condition: Stable    Follow Up: Eloy Jackson MD  [address]  [place] [address] [phone]    Schedule an appointment as soon as possible for a visit in 1 week(s)      Josephine Willard MD  [address]  [facility] [address] [phone]    Schedule an appointment as soon as possible for a visit in 1 week(s)              Diet: No diet orders on file    Discharge Medications:      Medication List        START taking these medications      ciprofloxacin 500 MG tablet  Commonly known as: CIPRO  Take 1 tablet by mouth 2 times daily for 5 days     docusate 100 MG Caps  Commonly known as: COLACE, DULCOLAX  Take 100 mg by mouth 2 times daily     miconazole 2 % powder  Commonly known as: MICOTIN  Apply topically 2 times daily.     ondansetron 4 MG tablet  Commonly known as: ZOFRAN  Take 1 tablet by mouth 3 times daily as needed for Nausea or Vomiting     senna 8.6 MG tablet  Commonly known as: SENOKOT  Take 1 tablet by mouth 2 times daily as needed (constipation)     warfarin 5 MG tablet  Commonly known as: COUMADIN  Check PT/INR in am            CHANGE how you take these medications      omeprazole 40 MG delayed release capsule  Commonly known as: PRILOSEC  Take 1 capsule by mouth in the morning and at

## 2024-02-16 LAB — SURGICAL PATHOLOGY REPORT: NORMAL

## 2024-02-21 ENCOUNTER — HOSPITAL ENCOUNTER (EMERGENCY)
Dept: VASCULAR LAB | Age: 84
Discharge: HOME | End: 2024-02-23
Payer: MEDICARE

## 2024-02-21 ENCOUNTER — HOSPITAL ENCOUNTER (EMERGENCY)
Age: 84
Discharge: HOME | End: 2024-02-21
Attending: EMERGENCY MEDICINE
Payer: MEDICARE

## 2024-02-21 VITALS
RESPIRATION RATE: 21 BRPM | TEMPERATURE: 98.2 F | HEIGHT: 63 IN | DIASTOLIC BLOOD PRESSURE: 47 MMHG | SYSTOLIC BLOOD PRESSURE: 104 MMHG | OXYGEN SATURATION: 94 % | HEART RATE: 65 BPM | WEIGHT: 200 LBS | BODY MASS INDEX: 35.44 KG/M2

## 2024-02-21 DIAGNOSIS — M71.20 SYNOVIAL CYST OF POPLITEAL SPACE, UNSPECIFIED LATERALITY: ICD-10-CM

## 2024-02-21 DIAGNOSIS — M79.89 LEG SWELLING: Primary | ICD-10-CM

## 2024-02-21 LAB
ANION GAP SERPL CALCULATED.3IONS-SCNC: 11 MMOL/L (ref 9–17)
BASOPHILS # BLD: 0.04 K/UL (ref 0–0.2)
BASOPHILS NFR BLD: 1 % (ref 0–2)
BUN SERPL-MCNC: 48 MG/DL (ref 8–23)
BUN/CREAT SERPL: 22 (ref 9–20)
CALCIUM SERPL-MCNC: 8.3 MG/DL (ref 8.6–10.4)
CHLORIDE SERPL-SCNC: 98 MMOL/L (ref 98–107)
CO2 SERPL-SCNC: 29 MMOL/L (ref 20–31)
CREAT SERPL-MCNC: 2.2 MG/DL (ref 0.5–0.9)
ECHO BSA: 2.01 M2
EOSINOPHIL # BLD: 0.24 K/UL (ref 0–0.44)
EOSINOPHILS RELATIVE PERCENT: 5 % (ref 1–4)
ERYTHROCYTE [DISTWIDTH] IN BLOOD BY AUTOMATED COUNT: 18.6 % (ref 11.8–14.4)
GFR SERPL CREATININE-BSD FRML MDRD: 22 ML/MIN/1.73M2
GLUCOSE SERPL-MCNC: 125 MG/DL (ref 70–99)
HCT VFR BLD AUTO: 34.4 % (ref 36.3–47.1)
HGB BLD-MCNC: 9.8 G/DL (ref 11.9–15.1)
IMM GRANULOCYTES # BLD AUTO: 0.02 K/UL (ref 0–0.3)
IMM GRANULOCYTES NFR BLD: 0 %
INR PPP: 4.1
LYMPHOCYTES NFR BLD: 1.37 K/UL (ref 1.1–3.7)
LYMPHOCYTES RELATIVE PERCENT: 26 % (ref 24–43)
MCH RBC QN AUTO: 26.7 PG (ref 25.2–33.5)
MCHC RBC AUTO-ENTMCNC: 28.5 G/DL (ref 28.4–34.8)
MCV RBC AUTO: 93.7 FL (ref 82.6–102.9)
MONOCYTES NFR BLD: 0.5 K/UL (ref 0.1–1.2)
MONOCYTES NFR BLD: 10 % (ref 3–12)
NEUTROPHILS NFR BLD: 58 % (ref 36–65)
NEUTS SEG NFR BLD: 3.05 K/UL (ref 1.5–8.1)
NRBC BLD-RTO: 0 PER 100 WBC
PARTIAL THROMBOPLASTIN TIME: 59.4 SEC (ref 23.9–33.8)
PLATELET # BLD AUTO: 165 K/UL (ref 138–453)
PMV BLD AUTO: 9.5 FL (ref 8.1–13.5)
POTASSIUM SERPL-SCNC: 4.1 MMOL/L (ref 3.7–5.3)
PROTHROMBIN TIME: 39 SEC (ref 11.5–14.2)
RBC # BLD AUTO: 3.67 M/UL (ref 3.95–5.11)
RBC # BLD: ABNORMAL 10*6/UL
SODIUM SERPL-SCNC: 138 MMOL/L (ref 135–144)
WBC OTHER # BLD: 5.2 K/UL (ref 3.5–11.3)

## 2024-02-21 PROCEDURE — 85730 THROMBOPLASTIN TIME PARTIAL: CPT

## 2024-02-21 PROCEDURE — 99284 EMERGENCY DEPT VISIT MOD MDM: CPT

## 2024-02-21 PROCEDURE — 93971 EXTREMITY STUDY: CPT

## 2024-02-21 PROCEDURE — 80048 BASIC METABOLIC PNL TOTAL CA: CPT

## 2024-02-21 PROCEDURE — 85025 COMPLETE CBC W/AUTO DIFF WBC: CPT

## 2024-02-21 PROCEDURE — 85610 PROTHROMBIN TIME: CPT

## 2024-02-21 RX ORDER — LANOLIN ALCOHOL/MO/W.PET/CERES
3 CREAM (GRAM) TOPICAL NIGHTLY PRN
COMMUNITY

## 2024-02-21 ASSESSMENT — PAIN - FUNCTIONAL ASSESSMENT: PAIN_FUNCTIONAL_ASSESSMENT: NONE - DENIES PAIN

## 2024-02-21 NOTE — ED NOTES
Pt to ED via EMS from [facility]. EMS states they were called d/t  DVT in pt's lt leg. 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 Pts leg appears swollen and red on lt calf, pt denies pain. 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 Pt denies any sob or chest pain at this time, pt does not have any other complaints at this time. Pt resting on ED stretcher connected to cardiac monitor, IV started by Dimple CORDERO, call light within reach, son at bedside, awaiting physician assessment.

## 2024-02-21 NOTE — ED PROVIDER NOTES
[facility] ED  [facility]      Pt Name: Jono Lindquist  MRN: [id number]  Birthdate 1940  Date ofevaluation: 2/21/2024  Provider: Fran Morales PA-C    CHIEF COMPLAINT       Chief Complaint   Patient presents with    Leg Swelling     Lt leg- DVT per facility.         HISTORY OF PRESENT ILLNESS  (Location/Symptom, Timing/Onset, Context/Setting, Quality, Duration, Modifying Factors, Severity.)   Jono Lindquist is a 83 y.o. female who presents to the emergency department with concern for left leg DVT.  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  Patient denies any pain at this time.  No fevers or chills.  No chest pain shortness of breath.  Apparently no DVT was seen at the nurse was delayed today and as result patient brought to the ER for evaluation.      Nursing Notes were reviewed.    ALLERGIES     Lisinopril    CURRENT MEDICATIONS       Previous Medications    ALLOPURINOL (ZYLOPRIM) 100 MG TABLET    Take 1 tablet by mouth daily    ATORVASTATIN (LIPITOR) 40 MG TABLET    Take 40 mg by mouth daily    BUMETANIDE (BUMEX) 2 MG TABLET    Take 1 tablet by mouth every other day alternating with 1.5 tablets every other day    DILTIAZEM (CARDIZEM CD) 120 MG EXTENDED RELEASE CAPSULE    Take 1 capsule by mouth every other day    DOCUSATE SODIUM (COLACE, DULCOLAX) 100 MG CAPS    Take 100 mg by mouth 2 times daily    HYDROXYZINE HCL (ATARAX) 10 MG TABLET    Take 1 tablet by mouth 3 times daily as needed for Itching    MAGNESIUM 400 MG TABS    Take 400 mg by mouth daily Indications: 2 tab BID    MELATONIN 3 MG TABS TABLET    Take 1 tablet by mouth nightly as needed (for insomnia)    MICONAZOLE (MICOTIN) 2 % POWDER    Apply topically 2 times daily.    NITROGLYCERIN (NITROSTAT) 0.4 MG SL TABLET    Place 0.4 mg under the tongue every 5 minutes as needed for Chest pain    OMEPRAZOLE (PRILOSEC) 40 MG DELAYED

## 2024-02-26 NOTE — PROGRESS NOTES
Physician Progress Note      PATIENT:               ZACARIAS ORTEGA  [facility] #:                  [id number]  :                       1940  ADMIT DATE:       2/10/2024 11:11 AM  DISCH DATE:        2024 2:46 PM  RESPONDING  PROVIDER #:        Chencho Obrien MD          QUERY TEXT:    Patient admitted with nausea, vomiting and diarrhea. In ED workup shows   patient have enteritis. Upon further review In  consult note states Acute   n/v/d, CT showing duodenitis. In  general surgery consult note states   Nausea, vomiting and diarrhea, most likely due to acute gastroenteritis.   Patient underwent EGD on . As per EGD finding patient have esophagitis,   gastric polyp and duodenal ulcer. Patient treated with IV protonix 02/10 to   . But there is no further documentation for etiology of nausea and   vomiting.      The medical record reflects the following:  Risk Factors: admitted with nausea, vomiting and diarrhea.  Clinical Indicators:  In ED workup shows patient have enteritis. Upon further   review In  consult note states Acute n/v/d, CT showing duodenitis. In    general surgery consult note states Nausea, vomiting and diarrhea, most   likely due to acute gastroenteritis. Patient underwent EGD on . As per   EGD finding patient have esophagitis, gastric polyp and duodenal ulcer.   Patient treated with IV protonix 02/10 to   Treatment: GI consult and EGD    Thank you.  Ambreen Jamison RN, Clinical Documentation [employer]ity, [employer]   [employer], [facility], CRCR  Options provided:  -- Nausea and vomiting due to duodenal ulcer  -- Nausea and vomiting due to esophagitis  -- Nausea and vomiting due to gastroenteritis  -- Nausea and vomiting due to gastric polyp  -- Nausea and vomiting due to other source please mention, Please specify   cause.  -- Other - I will add my own diagnosis  -- Disagree - Not applicable / Not valid  -- Disagree - Clinically unable to determine / Unknown  --

## (undated) DEVICE — BITEBLOCK ENDOSCP 60FR MAXI WHT POLYETH STURDY W/ VELC WVN

## (undated) DEVICE — STAZ ENDO KIT: Brand: MEDLINE INDUSTRIES, INC.

## (undated) DEVICE — FORCEPS BX L240CM WRK CHN 2.8MM STD CAP W/ NDL MIC MESH